# Patient Record
Sex: FEMALE | Race: OTHER | NOT HISPANIC OR LATINO | Employment: OTHER | URBAN - METROPOLITAN AREA
[De-identification: names, ages, dates, MRNs, and addresses within clinical notes are randomized per-mention and may not be internally consistent; named-entity substitution may affect disease eponyms.]

---

## 2017-03-05 RX ORDER — ESTRADIOL 0.1 MG/G
CREAM VAGINAL
Qty: 42.5 G | Refills: 1 | Status: SHIPPED | OUTPATIENT
Start: 2017-03-05 | End: 2019-11-06

## 2017-12-17 ENCOUNTER — OFFICE VISIT (OUTPATIENT)
Dept: URGENT CARE | Facility: CLINIC | Age: 57
End: 2017-12-17
Payer: COMMERCIAL

## 2017-12-17 VITALS
HEIGHT: 62 IN | SYSTOLIC BLOOD PRESSURE: 110 MMHG | WEIGHT: 160 LBS | HEART RATE: 89 BPM | RESPIRATION RATE: 18 BRPM | OXYGEN SATURATION: 99 % | TEMPERATURE: 100 F | DIASTOLIC BLOOD PRESSURE: 69 MMHG | BODY MASS INDEX: 29.44 KG/M2

## 2017-12-17 DIAGNOSIS — R05.9 COUGH: ICD-10-CM

## 2017-12-17 DIAGNOSIS — J10.1 INFLUENZA A: Primary | ICD-10-CM

## 2017-12-17 LAB
CTP QC/QA: YES
CTP QC/QA: YES
FLUAV AG NPH QL: POSITIVE
FLUBV AG NPH QL: NEGATIVE
S PYO RRNA THROAT QL PROBE: NEGATIVE

## 2017-12-17 PROCEDURE — 87804 INFLUENZA ASSAY W/OPTIC: CPT | Mod: QW,S$GLB,, | Performed by: PHYSICIAN ASSISTANT

## 2017-12-17 PROCEDURE — 96372 THER/PROPH/DIAG INJ SC/IM: CPT | Mod: S$GLB,,, | Performed by: PHYSICIAN ASSISTANT

## 2017-12-17 PROCEDURE — 99203 OFFICE O/P NEW LOW 30 MIN: CPT | Mod: 25,S$GLB,, | Performed by: PHYSICIAN ASSISTANT

## 2017-12-17 PROCEDURE — 87880 STREP A ASSAY W/OPTIC: CPT | Mod: QW,S$GLB,, | Performed by: PHYSICIAN ASSISTANT

## 2017-12-17 RX ORDER — PROMETHAZINE HYDROCHLORIDE AND CODEINE PHOSPHATE 6.25; 1 MG/5ML; MG/5ML
5 SOLUTION ORAL EVERY 6 HOURS PRN
Qty: 118 ML | Refills: 0 | Status: SHIPPED | OUTPATIENT
Start: 2017-12-17 | End: 2017-12-22

## 2017-12-17 RX ORDER — OSELTAMIVIR PHOSPHATE 75 MG/1
75 CAPSULE ORAL 2 TIMES DAILY
Qty: 10 CAPSULE | Refills: 0 | Status: SHIPPED | OUTPATIENT
Start: 2017-12-17 | End: 2017-12-22

## 2017-12-17 RX ORDER — BENZONATATE 200 MG/1
200 CAPSULE ORAL 3 TIMES DAILY PRN
Qty: 30 CAPSULE | Refills: 0 | Status: SHIPPED | OUTPATIENT
Start: 2017-12-17 | End: 2017-12-27

## 2017-12-17 RX ORDER — BETAMETHASONE SODIUM PHOSPHATE AND BETAMETHASONE ACETATE 3; 3 MG/ML; MG/ML
9 INJECTION, SUSPENSION INTRA-ARTICULAR; INTRALESIONAL; INTRAMUSCULAR; SOFT TISSUE ONCE
Status: COMPLETED | OUTPATIENT
Start: 2017-12-17 | End: 2017-12-17

## 2017-12-17 RX ADMIN — BETAMETHASONE SODIUM PHOSPHATE AND BETAMETHASONE ACETATE 9 MG: 3; 3 INJECTION, SUSPENSION INTRA-ARTICULAR; INTRALESIONAL; INTRAMUSCULAR; SOFT TISSUE at 09:12

## 2017-12-17 NOTE — PATIENT INSTRUCTIONS
- Please return here or go to the Emergency Department for any concerns or worsening of condition.   - If you were prescribed antibiotics, please take them to completion.  - If you were prescribed a narcotic medication, do not drive or operate heavy equipment or machinery while taking these medications.  - Please follow up with your primary care provider (PCP) or discussed specialist(s) as needed.           Influenza (Adult)    Influenza is also called the flu. It is a viral illness that affects the air passages of your lungs. It is different from the common cold. The flu can easily be passed from one to person to another. It may be spread through the air by coughing and sneezing. Or it can be spread by touching the sick person and then touching your own eyes, nose, or mouth.  The flu starts 1 to 3 days after you are exposed to the flu virus. It may last for 1 to 2 weeks but many people feel tired or fatigued for many weeks afterward. You usually dont need to take antibiotics unless you have a complication. This might be an ear or sinus infection or pneumonia.  Symptoms of the flu may be mild or severe. They can include extreme tiredness (wanting to stay in bed all day), chills, fevers, muscle aches, soreness with eye movement, headache, and a dry, hacking cough.  Home care  Follow these guidelines when caring for yourself at home:  · Avoid being around cigarette smoke, whether yours or other peoples.  · Acetaminophen or ibuprofen will help ease your fever, muscle aches, and headache. Dont give aspirin to anyone younger than 18 who has the flu. Aspirin can harm the liver.  · Nausea and loss of appetite are common with the flu. Eat light meals. Drink 6 to 8 glasses of liquids every day. Good choices are water, sport drinks, soft drinks without caffeine, juices, tea, and soup. Extra fluids will also help loosen secretions in your nose and lungs.  · Over-the-counter cold medicines will not make the flu go away  faster. But the medicines may help with coughing, sore throat, and congestion in your nose and sinuses. Dont use a decongestant if you have high blood pressure.  · Stay home until your fever has been gone for at least 24 hours without using medicine to reduce fever.  Follow-up care  Follow up with your healthcare provider, or as advised, if you are not getting better over the next week.  If you are age 65 or older, talk with your provider about getting a pneumococcal vaccine every 5 years. You should also get this vaccine if you have chronic asthma or COPD. All adults should get a flu vaccine every fall. Ask your provider about this.  When to seek medical advice  Call your healthcare provider right away if any of these occur:  · Cough with lots of colored mucus (sputum) or blood in your mucus  · Chest pain, shortness of breath, wheezing, or trouble breathing  · Severe headache, or face, neck, or ear pain  · New rash with fever  · Fever of 100.4°F (38°C) or higher, or as directed by your healthcare provider  · Confusion, behavior change, or seizure  · Severe weakness or dizziness  · You get a new fever or cough after getting better for a few days  Date Last Reviewed: 1/1/2017  © 4428-7091 [employer], [place]. [address] [place] [address], [place] [address]. All rights reserved. This information is not intended as a substitute for professional medical care. Always follow your healthcare professional's instructions.

## 2017-12-17 NOTE — PROGRESS NOTES
"Subjective:       Patient ID: Mayelin Chowdary is a 57 y.o. female.    Vitals:  height is 5' 2" (1.575 m) and weight is 72.6 kg (160 lb). Her tympanic temperature is 99.6 °F (37.6 °C). Her blood pressure is 110/69 and her pulse is 89. Her respiration is 18 and oxygen saturation is 99%.     Chief Complaint: Cough (since friday )    Present with cough and recent fever since Friday.        Cough   This is a new problem. The current episode started in the past 7 days (2 days ). The problem has been gradually worsening. The problem occurs constantly. The cough is non-productive. Associated symptoms include chills, a fever (102), headaches, myalgias, nasal congestion and a sore throat (from cough ). Pertinent negatives include no chest pain, ear pain, eye redness, rash, shortness of breath or wheezing. Treatments tried: nyquil and tylenol tessalon pearls  The treatment provided no relief.     Review of Systems   Constitution: Positive for chills, fever (102) and malaise/fatigue.   HENT: Positive for congestion and sore throat (from cough ). Negative for ear pain and hoarse voice.    Eyes: Negative for blurred vision, discharge, pain, redness and visual disturbance.   Cardiovascular: Negative for chest pain, dyspnea on exertion, leg swelling, near-syncope and syncope.   Respiratory: Positive for cough. Negative for shortness of breath, sputum production and wheezing.    Hematologic/Lymphatic: Negative for adenopathy.   Skin: Negative for itching and rash.   Musculoskeletal: Positive for myalgias. Negative for back pain, neck pain and stiffness.   Gastrointestinal: Negative for abdominal pain, diarrhea, nausea and vomiting.   Neurological: Positive for headaches. Negative for dizziness, light-headedness and numbness.   Psychiatric/Behavioral: Negative for altered mental status.   Allergic/Immunologic: Negative for hives.   All other systems reviewed and are negative.      Objective:      Physical Exam   Constitutional: She " is oriented to person, place, and time. She appears well-developed and well-nourished.  Non-toxic appearance. She has a sickly appearance. She does not appear ill. No distress.   HENT:   Head: Normocephalic and atraumatic.   Right Ear: Hearing, tympanic membrane, external ear and ear canal normal.   Left Ear: Hearing, tympanic membrane, external ear and ear canal normal.   Nose: No mucosal edema. No epistaxis. Right sinus exhibits no maxillary sinus tenderness and no frontal sinus tenderness. Left sinus exhibits no maxillary sinus tenderness and no frontal sinus tenderness.   Mouth/Throat: Uvula is midline and mucous membranes are normal. No uvula swelling. Posterior oropharyngeal erythema present. No oropharyngeal exudate.   Bilateral clear nasal congestion and erythema    Eyes: Pupils are equal, round, and reactive to light.   Neck: Normal range of motion. Neck supple.   Cardiovascular: Normal rate, regular rhythm and normal heart sounds.  Exam reveals no gallop and no friction rub.    No murmur heard.  Pulmonary/Chest: Effort normal and breath sounds normal. No respiratory distress. She has no decreased breath sounds. She has no wheezes. She has no rhonchi. She has no rales.   Musculoskeletal: Normal range of motion.   Lymphadenopathy:        Head (right side): No submental, no submandibular, no preauricular, no posterior auricular and no occipital adenopathy present.        Head (left side): No submental, no submandibular, no preauricular, no posterior auricular and no occipital adenopathy present.     She has no cervical adenopathy.        Right cervical: No posterior cervical adenopathy present.       Left cervical: No posterior cervical adenopathy present.        Right: No supraclavicular adenopathy present.        Left: No supraclavicular adenopathy present.   Neurological: She is alert and oriented to person, place, and time. She is not disoriented. Coordination and gait normal.   Skin: No abrasion, no  ecchymosis, no laceration and no rash noted. No erythema.   Psychiatric: She has a normal mood and affect. Her behavior is normal.   Nursing note and vitals reviewed.      Assessment:       1. Influenza A    2. Cough        Plan:         Influenza A  -     oseltamivir (TAMIFLU) 75 MG capsule; Take 1 capsule (75 mg total) by mouth 2 (two) times daily.  Dispense: 10 capsule; Refill: 0  -     promethazine-codeine 6.25-10 mg/5 ml (PHENERGAN WITH CODEINE) 6.25-10 mg/5 mL syrup; Take 5 mLs by mouth every 6 (six) hours as needed for Cough.  Dispense: 118 mL; Refill: 0  -     betamethasone acetate-betamethasone sodium phosphate injection 9 mg; Inject 1.5 mLs (9 mg total) into the muscle once.  -     benzonatate (TESSALON) 200 MG capsule; Take 1 capsule (200 mg total) by mouth 3 (three) times daily as needed for Cough.  Dispense: 30 capsule; Refill: 0    Cough  -     POCT rapid strep A  -     POCT Influenza A/B

## 2018-01-09 ENCOUNTER — TELEPHONE (OUTPATIENT)
Dept: OBSTETRICS AND GYNECOLOGY | Facility: CLINIC | Age: 58
End: 2018-01-09

## 2018-01-09 NOTE — TELEPHONE ENCOUNTER
Dr. Amato's office calling for patients last pap smear results with last office visit notes,please call shruthi back for more info

## 2019-11-06 ENCOUNTER — TELEPHONE (OUTPATIENT)
Dept: OBSTETRICS AND GYNECOLOGY | Facility: CLINIC | Age: 59
End: 2019-11-06

## 2019-11-06 ENCOUNTER — OFFICE VISIT (OUTPATIENT)
Dept: OBSTETRICS AND GYNECOLOGY | Facility: CLINIC | Age: 59
End: 2019-11-06
Attending: OBSTETRICS & GYNECOLOGY
Payer: COMMERCIAL

## 2019-11-06 VITALS
DIASTOLIC BLOOD PRESSURE: 82 MMHG | HEIGHT: 62 IN | BODY MASS INDEX: 29.8 KG/M2 | WEIGHT: 161.94 LBS | SYSTOLIC BLOOD PRESSURE: 116 MMHG

## 2019-11-06 DIAGNOSIS — M89.9 BONE DISORDER: ICD-10-CM

## 2019-11-06 DIAGNOSIS — Z12.31 VISIT FOR SCREENING MAMMOGRAM: Primary | ICD-10-CM

## 2019-11-06 DIAGNOSIS — Z01.419 ENCOUNTER FOR GYNECOLOGICAL EXAMINATION (GENERAL) (ROUTINE) WITHOUT ABNORMAL FINDINGS: ICD-10-CM

## 2019-11-06 DIAGNOSIS — Z78.0 MENOPAUSE: ICD-10-CM

## 2019-11-06 PROCEDURE — 99999 PR PBB SHADOW E&M-EST. PATIENT-LVL III: CPT | Mod: PBBFAC,,, | Performed by: OBSTETRICS & GYNECOLOGY

## 2019-11-06 PROCEDURE — 99386 PREV VISIT NEW AGE 40-64: CPT | Mod: S$GLB,,, | Performed by: OBSTETRICS & GYNECOLOGY

## 2019-11-06 PROCEDURE — 99386 PR PREVENTIVE VISIT,NEW,40-64: ICD-10-PCS | Mod: S$GLB,,, | Performed by: OBSTETRICS & GYNECOLOGY

## 2019-11-06 PROCEDURE — 99999 PR PBB SHADOW E&M-EST. PATIENT-LVL III: ICD-10-PCS | Mod: PBBFAC,,, | Performed by: OBSTETRICS & GYNECOLOGY

## 2019-11-06 RX ORDER — LEVOTHYROXINE SODIUM 137 UG/1
TABLET ORAL
Refills: 3 | COMMUNITY
Start: 2019-10-28 | End: 2021-01-08 | Stop reason: SDUPTHER

## 2019-11-06 RX ORDER — ROSUVASTATIN CALCIUM 20 MG/1
20 TABLET, COATED ORAL DAILY
Refills: 3 | COMMUNITY
Start: 2019-08-27 | End: 2021-05-13 | Stop reason: SDUPTHER

## 2019-11-06 RX ORDER — ESTRADIOL 1 MG/1
1 TABLET ORAL DAILY
Qty: 90 TABLET | Refills: 3 | Status: SHIPPED | OUTPATIENT
Start: 2019-11-06 | End: 2020-03-12

## 2019-11-06 RX ORDER — PROGESTERONE 100 MG/1
100 CAPSULE ORAL NIGHTLY
Qty: 90 CAPSULE | Refills: 3 | Status: SHIPPED | OUTPATIENT
Start: 2019-11-06 | End: 2021-01-06

## 2019-11-06 NOTE — TELEPHONE ENCOUNTER
Dr. Sotomayor pt, would like to know the name of the testerterone cream Dr. Yi recommend her to use. Please call pt and advise, thank you.

## 2019-11-06 NOTE — TELEPHONE ENCOUNTER
PLEASE DISREGARD PREVIOUS MESSAGE.    Dr. Sotomayor calling to check on the status of the new medication Dr. Sotomayor was going to call in for her at [facility] pharmacy. Please call pt and advise, thank you.

## 2019-11-06 NOTE — PROGRESS NOTES
Subjective:       Patient ID: Mayelin Chowdary is a 59 y.o. female.    Chief Complaint:  Annual Exam (c/o vag dyness, headaches, not sleeping well )      There is no problem list on file for this patient.      History of Present Illness  59 y.o. yo  here for annual exam. Reports she has some vaginal dryness and insomnia. On Premarin 0.9 and has been for years. I discussed other options. Patient would like to try. Will do Estradiol 1 mg and Prometrium 100 mg. Risks and benefits explained. All questions answered. Daughter in law is Andree Chowdary and they are moving to [place]. Pt sad      Past Medical History:   Diagnosis Date    Anxiety     High cholesterol     Hypothyroid     IBS (irritable bowel syndrome)     Migraines        Past Surgical History:   Procedure Laterality Date    APPENDECTOMY      bladder       HYSTERECTOMY      LUMPECTOMY,BREAST, WITH RADIOACTIVE SEED LOCALIZATION AND SENTINEL LYMPH NODE BIOPSY  2017    OOPHORECTOMY  1987    SHOULDER SURGERY Right     TUBAL LIGATION         OB History    Para Term  AB Living   2 2 2     2   SAB TAB Ectopic Multiple Live Births           2      # Outcome Date GA Lbr Benjamin/2nd Weight Sex Delivery Anes PTL Lv   2 Term 80    M Vag-Spont   CLARI   1 Term 77    F Vag-Spont   CLARI      Obstetric Comments   Menarche at 11       No LMP recorded. Patient has had a hysterectomy.   Date of Last Pap: No result found    Review of Systems  Review of Systems   Constitutional: Negative for fatigue and unexpected weight change.   Respiratory: Negative for shortness of breath.    Cardiovascular: Negative for chest pain.   Gastrointestinal: Negative for abdominal pain, constipation, diarrhea, nausea and vomiting.   Genitourinary: Negative for dysuria.   Musculoskeletal: Negative for back pain.   Skin: Negative for rash.   Neurological: Negative for headaches.   Hematological: Does not bruise/bleed easily.    Psychiatric/Behavioral: Negative for behavioral problems.        Objective:   Physical Exam:   Constitutional: She is oriented to person, place, and time. Vital signs are normal. She appears well-developed and well-nourished. No distress.        Pulmonary/Chest: She exhibits no mass. Right breast exhibits no mass, no nipple discharge, no skin change, no tenderness, no bleeding and no swelling. Left breast exhibits no mass, no nipple discharge, no skin change, no tenderness, no bleeding and no swelling. Breasts are symmetrical.        Abdominal: Soft. Normal appearance and bowel sounds are normal. She exhibits no distension and no mass. There is no tenderness. There is no rebound.     Genitourinary: Vagina normal. There is no rash, tenderness, lesion or injury on the right labia. There is no rash, tenderness, lesion or injury on the left labia. Uterus is absent. Right adnexum displays no mass, no tenderness and no fullness. Left adnexum displays no mass, no tenderness and no fullness. No erythema, tenderness, rectocele, cystocele or unspecified prolapse of vaginal walls in the vagina. No vaginal discharge found. Cervix exhibits absence.           Musculoskeletal: Normal range of motion and moves all extremeties.      Lymphadenopathy:     She has no axillary adenopathy.        Right: No supraclavicular adenopathy present.        Left: No supraclavicular adenopathy present.    Neurological: She is alert and oriented to person, place, and time.    Skin: Skin is warm and dry.    Psychiatric: She has a normal mood and affect. Her behavior is normal. Judgment normal.        Assessment/ Plan:     1. Visit for screening mammogram  Mammo Digital Screening Bilat w/ [facility]   2. Bone disorder  DXA Bone Density Spine And Hip   3. Encounter for gynecological examination (general) (routine) without abnormal findings     4. Menopause  progesterone (PROMETRIUM) 100 MG capsule    estradiol (ESTRACE) 1 MG tablet       Follow-up with me  in 1 year

## 2019-11-13 ENCOUNTER — HOSPITAL ENCOUNTER (OUTPATIENT)
Dept: RADIOLOGY | Facility: HOSPITAL | Age: 59
Discharge: HOME | End: 2019-11-13
Attending: OBSTETRICS & GYNECOLOGY
Payer: COMMERCIAL

## 2019-11-13 ENCOUNTER — HOSPITAL ENCOUNTER (OUTPATIENT)
Dept: RADIOLOGY | Facility: CLINIC | Age: 59
Discharge: HOME | End: 2019-11-13
Attending: OBSTETRICS & GYNECOLOGY
Payer: COMMERCIAL

## 2019-11-13 VITALS — BODY MASS INDEX: 29.82 KG/M2 | WEIGHT: 162.06 LBS | HEIGHT: 62 IN

## 2019-11-13 DIAGNOSIS — M89.9 BONE DISORDER: ICD-10-CM

## 2019-11-13 DIAGNOSIS — Z12.31 VISIT FOR SCREENING MAMMOGRAM: ICD-10-CM

## 2019-11-13 PROCEDURE — 77067 SCR MAMMO BI INCL CAD: CPT | Mod: TC

## 2019-11-13 PROCEDURE — 77080 DEXA BONE DENSITY SPINE HIP: ICD-10-PCS | Mod: 26,,, | Performed by: INTERNAL MEDICINE

## 2019-11-13 PROCEDURE — 77067 SCR MAMMO BI INCL CAD: CPT | Mod: 26,,, | Performed by: RADIOLOGY

## 2019-11-13 PROCEDURE — 77063 BREAST TOMOSYNTHESIS BI: CPT | Mod: 26,,, | Performed by: RADIOLOGY

## 2019-11-13 PROCEDURE — 77067 MAMMO DIGITAL SCREENING BILAT WITH TOMOSYNTHESIS_CAD: ICD-10-PCS | Mod: 26,,, | Performed by: RADIOLOGY

## 2019-11-13 PROCEDURE — 77080 DXA BONE DENSITY AXIAL: CPT | Mod: TC

## 2019-11-13 PROCEDURE — 77080 DXA BONE DENSITY AXIAL: CPT | Mod: 26,,, | Performed by: INTERNAL MEDICINE

## 2019-11-13 PROCEDURE — 77063 MAMMO DIGITAL SCREENING BILAT WITH TOMOSYNTHESIS_CAD: ICD-10-PCS | Mod: 26,,, | Performed by: RADIOLOGY

## 2019-11-20 ENCOUNTER — TELEPHONE (OUTPATIENT)
Dept: OBSTETRICS AND GYNECOLOGY | Facility: CLINIC | Age: 59
End: 2019-11-20

## 2019-11-20 NOTE — TELEPHONE ENCOUNTER
----- Message from Emilia Sotomayor MD sent at 11/20/2019  8:47 AM CST -----  Call pt. Bone density is normal. They are still waiting on her old MMG films to compare to this one before they issue a MMG report

## 2020-03-12 ENCOUNTER — TELEPHONE (OUTPATIENT)
Dept: OBSTETRICS AND GYNECOLOGY | Facility: CLINIC | Age: 60
End: 2020-03-12

## 2020-03-12 NOTE — TELEPHONE ENCOUNTER
Dr Sotomayor pt calling, to switch back to her Premarin 0.9 mg needs a New rx. Pt # [phone] Atif # [phone]

## 2021-01-06 ENCOUNTER — OFFICE VISIT (OUTPATIENT)
Dept: PRIMARY CARE CLINIC | Facility: CLINIC | Age: 61
End: 2021-01-06
Payer: COMMERCIAL

## 2021-01-06 VITALS
HEIGHT: 62 IN | TEMPERATURE: 98 F | OXYGEN SATURATION: 99 % | RESPIRATION RATE: 19 BRPM | HEART RATE: 60 BPM | WEIGHT: 166.88 LBS | BODY MASS INDEX: 30.71 KG/M2 | DIASTOLIC BLOOD PRESSURE: 72 MMHG | SYSTOLIC BLOOD PRESSURE: 123 MMHG

## 2021-01-06 DIAGNOSIS — E03.9 HYPOTHYROIDISM, UNSPECIFIED TYPE: Primary | ICD-10-CM

## 2021-01-06 DIAGNOSIS — Z13.220 SCREENING FOR LIPOID DISORDERS: ICD-10-CM

## 2021-01-06 DIAGNOSIS — Z11.59 NEED FOR HEPATITIS C SCREENING TEST: ICD-10-CM

## 2021-01-06 DIAGNOSIS — R09.81 NASAL CONGESTION: ICD-10-CM

## 2021-01-06 DIAGNOSIS — Z11.4 ENCOUNTER FOR SCREENING FOR HIV: ICD-10-CM

## 2021-01-06 DIAGNOSIS — N28.1 RENAL CYST, LEFT: ICD-10-CM

## 2021-01-06 DIAGNOSIS — G43.809 OTHER MIGRAINE WITHOUT STATUS MIGRAINOSUS, NOT INTRACTABLE: ICD-10-CM

## 2021-01-06 DIAGNOSIS — K76.89 HEPATIC CYST: ICD-10-CM

## 2021-01-06 DIAGNOSIS — Z00.00 NORMAL PHYSICAL EXAM, ROUTINE: ICD-10-CM

## 2021-01-06 PROBLEM — G43.909 MIGRAINE HEADACHE: Status: ACTIVE | Noted: 2021-01-06

## 2021-01-06 PROBLEM — K21.9 GASTROESOPHAGEAL REFLUX DISEASE: Status: ACTIVE | Noted: 2021-01-06

## 2021-01-06 PROCEDURE — 99204 PR OFFICE/OUTPT VISIT, NEW, LEVL IV, 45-59 MIN: ICD-10-PCS | Mod: S$GLB,,, | Performed by: INTERNAL MEDICINE

## 2021-01-06 PROCEDURE — 99999 PR PBB SHADOW E&M-EST. PATIENT-LVL IV: ICD-10-PCS | Mod: PBBFAC,,, | Performed by: INTERNAL MEDICINE

## 2021-01-06 PROCEDURE — 99204 OFFICE O/P NEW MOD 45 MIN: CPT | Mod: S$GLB,,, | Performed by: INTERNAL MEDICINE

## 2021-01-06 PROCEDURE — U0003 INFECTIOUS AGENT DETECTION BY NUCLEIC ACID (DNA OR RNA); SEVERE ACUTE RESPIRATORY SYNDROME CORONAVIRUS 2 (SARS-COV-2) (CORONAVIRUS DISEASE [COVID-19]), AMPLIFIED PROBE TECHNIQUE, MAKING USE OF HIGH THROUGHPUT TECHNOLOGIES AS DESCRIBED BY CMS-2020-01-R: HCPCS

## 2021-01-06 PROCEDURE — 1126F AMNT PAIN NOTED NONE PRSNT: CPT | Mod: S$GLB,,, | Performed by: INTERNAL MEDICINE

## 2021-01-06 PROCEDURE — 99999 PR PBB SHADOW E&M-EST. PATIENT-LVL IV: CPT | Mod: PBBFAC,,, | Performed by: INTERNAL MEDICINE

## 2021-01-06 PROCEDURE — 1126F PR PAIN SEVERITY QUANTIFIED, NO PAIN PRESENT: ICD-10-PCS | Mod: S$GLB,,, | Performed by: INTERNAL MEDICINE

## 2021-01-06 PROCEDURE — 3008F PR BODY MASS INDEX (BMI) DOCUMENTED: ICD-10-PCS | Mod: CPTII,S$GLB,, | Performed by: INTERNAL MEDICINE

## 2021-01-06 PROCEDURE — 3008F BODY MASS INDEX DOCD: CPT | Mod: CPTII,S$GLB,, | Performed by: INTERNAL MEDICINE

## 2021-01-06 RX ORDER — INFLUENZA A VIRUS A/NEBRASKA/14/2019 (H1N1) ANTIGEN (MDCK CELL DERIVED, PROPIOLACTONE INACTIVATED), INFLUENZA A VIRUS A/DELAWARE/39/2019 (H3N2) ANTIGEN (MDCK CELL DERIVED, PROPIOLACTONE INACTIVATED), INFLUENZA B VIRUS B/SINGAPORE/INFTT-16-0610/2016 ANTIGEN (MDCK CELL DERIVED, PROPIOLACTONE INACTIVATED), INFLUENZA B VIRUS B/DARWIN/7/2019 ANTIGEN (MDCK CELL DERIVED, PROPIOLACTONE INACTIVATED) 15; 15; 15; 15 UG/.5ML; UG/.5ML; UG/.5ML; UG/.5ML
INJECTION, SUSPENSION INTRAMUSCULAR
COMMUNITY
Start: 2020-11-18 | End: 2022-10-31

## 2021-01-06 RX ORDER — BUTALBITAL, ACETAMINOPHEN AND CAFFEINE 50; 325; 40 MG/1; MG/1; MG/1
TABLET ORAL
Qty: 30 TABLET | Refills: 0 | Status: SHIPPED | OUTPATIENT
Start: 2021-01-06 | End: 2021-05-13

## 2021-01-07 LAB — SARS-COV-2 RNA RESP QL NAA+PROBE: NOT DETECTED

## 2021-01-08 ENCOUNTER — PATIENT MESSAGE (OUTPATIENT)
Dept: PRIMARY CARE CLINIC | Facility: CLINIC | Age: 61
End: 2021-01-08

## 2021-01-08 DIAGNOSIS — E03.9 HYPOTHYROIDISM, UNSPECIFIED TYPE: Primary | ICD-10-CM

## 2021-01-08 RX ORDER — LEVOTHYROXINE SODIUM 175 UG/1
175 TABLET ORAL
Qty: 30 TABLET | Refills: 1 | Status: SHIPPED | OUTPATIENT
Start: 2021-01-08 | End: 2021-03-09

## 2021-01-11 ENCOUNTER — PATIENT OUTREACH (OUTPATIENT)
Dept: ADMINISTRATIVE | Facility: HOSPITAL | Age: 61
End: 2021-01-11

## 2021-01-12 ENCOUNTER — LAB VISIT (OUTPATIENT)
Dept: LAB | Facility: HOSPITAL | Age: 61
End: 2021-01-12
Attending: INTERNAL MEDICINE
Payer: COMMERCIAL

## 2021-01-12 DIAGNOSIS — Z11.59 NEED FOR HEPATITIS C SCREENING TEST: ICD-10-CM

## 2021-01-12 DIAGNOSIS — Z13.220 SCREENING FOR LIPOID DISORDERS: ICD-10-CM

## 2021-01-12 DIAGNOSIS — Z00.00 NORMAL PHYSICAL EXAM, ROUTINE: ICD-10-CM

## 2021-01-12 DIAGNOSIS — E03.9 HYPOTHYROIDISM, UNSPECIFIED TYPE: ICD-10-CM

## 2021-01-12 DIAGNOSIS — Z12.31 OTHER SCREENING MAMMOGRAM: ICD-10-CM

## 2021-01-12 DIAGNOSIS — Z11.4 ENCOUNTER FOR SCREENING FOR HIV: ICD-10-CM

## 2021-01-12 LAB
ALBUMIN SERPL BCP-MCNC: 3.8 G/DL (ref 3.5–5.2)
ALP SERPL-CCNC: 66 U/L (ref 55–135)
ALT SERPL W/O P-5'-P-CCNC: 18 U/L (ref 10–44)
ANION GAP SERPL CALC-SCNC: 9 MMOL/L (ref 8–16)
AST SERPL-CCNC: 15 U/L (ref 10–40)
BASOPHILS # BLD AUTO: 0.03 K/UL (ref 0–0.2)
BASOPHILS NFR BLD: 0.5 % (ref 0–1.9)
BILIRUB SERPL-MCNC: 0.2 MG/DL (ref 0.1–1)
BUN SERPL-MCNC: 15 MG/DL (ref 8–23)
CALCIUM SERPL-MCNC: 8.8 MG/DL (ref 8.7–10.5)
CHLORIDE SERPL-SCNC: 104 MMOL/L (ref 95–110)
CHOLEST SERPL-MCNC: 207 MG/DL (ref 120–199)
CHOLEST/HDLC SERPL: 3.5 {RATIO} (ref 2–5)
CO2 SERPL-SCNC: 27 MMOL/L (ref 23–29)
CREAT SERPL-MCNC: 0.8 MG/DL (ref 0.5–1.4)
DIFFERENTIAL METHOD: NORMAL
EOSINOPHIL # BLD AUTO: 0.1 K/UL (ref 0–0.5)
EOSINOPHIL NFR BLD: 1.7 % (ref 0–8)
ERYTHROCYTE [DISTWIDTH] IN BLOOD BY AUTOMATED COUNT: 13.2 % (ref 11.5–14.5)
EST. GFR  (AFRICAN AMERICAN): >60 ML/MIN/1.73 M^2
EST. GFR  (NON AFRICAN AMERICAN): >60 ML/MIN/1.73 M^2
GLUCOSE SERPL-MCNC: 103 MG/DL (ref 70–110)
HCT VFR BLD AUTO: 43.4 % (ref 37–48.5)
HDLC SERPL-MCNC: 60 MG/DL (ref 40–75)
HDLC SERPL: 29 % (ref 20–50)
HGB BLD-MCNC: 13.9 G/DL (ref 12–16)
IMM GRANULOCYTES # BLD AUTO: 0.02 K/UL (ref 0–0.04)
IMM GRANULOCYTES NFR BLD AUTO: 0.3 % (ref 0–0.5)
LDLC SERPL CALC-MCNC: 116.4 MG/DL (ref 63–159)
LYMPHOCYTES # BLD AUTO: 2.4 K/UL (ref 1–4.8)
LYMPHOCYTES NFR BLD: 36.3 % (ref 18–48)
MCH RBC QN AUTO: 29.1 PG (ref 27–31)
MCHC RBC AUTO-ENTMCNC: 32 G/DL (ref 32–36)
MCV RBC AUTO: 91 FL (ref 82–98)
MONOCYTES # BLD AUTO: 0.6 K/UL (ref 0.3–1)
MONOCYTES NFR BLD: 8.3 % (ref 4–15)
NEUTROPHILS # BLD AUTO: 3.5 K/UL (ref 1.8–7.7)
NEUTROPHILS NFR BLD: 52.9 % (ref 38–73)
NONHDLC SERPL-MCNC: 147 MG/DL
NRBC BLD-RTO: 0 /100 WBC
PLATELET # BLD AUTO: 241 K/UL (ref 150–350)
PMV BLD AUTO: 11 FL (ref 9.2–12.9)
POTASSIUM SERPL-SCNC: 4.5 MMOL/L (ref 3.5–5.1)
PROT SERPL-MCNC: 6.6 G/DL (ref 6–8.4)
RBC # BLD AUTO: 4.77 M/UL (ref 4–5.4)
SODIUM SERPL-SCNC: 140 MMOL/L (ref 136–145)
T3FREE SERPL-MCNC: 3.5 PG/ML (ref 2.3–4.2)
T4 FREE SERPL-MCNC: 1.26 NG/DL (ref 0.71–1.51)
TRIGL SERPL-MCNC: 153 MG/DL (ref 30–150)
TSH SERPL DL<=0.005 MIU/L-ACNC: 0.17 UIU/ML (ref 0.4–4)
WBC # BLD AUTO: 6.61 K/UL (ref 3.9–12.7)

## 2021-01-12 PROCEDURE — 36415 COLL VENOUS BLD VENIPUNCTURE: CPT | Mod: PN

## 2021-01-12 PROCEDURE — 80053 COMPREHEN METABOLIC PANEL: CPT

## 2021-01-12 PROCEDURE — 86803 HEPATITIS C AB TEST: CPT

## 2021-01-12 PROCEDURE — 80061 LIPID PANEL: CPT

## 2021-01-12 PROCEDURE — 85025 COMPLETE CBC W/AUTO DIFF WBC: CPT

## 2021-01-12 PROCEDURE — 84443 ASSAY THYROID STIM HORMONE: CPT

## 2021-01-12 PROCEDURE — 84481 FREE ASSAY (FT-3): CPT

## 2021-01-12 PROCEDURE — 84439 ASSAY OF FREE THYROXINE: CPT

## 2021-01-12 PROCEDURE — 86703 HIV-1/HIV-2 1 RESULT ANTBDY: CPT

## 2021-01-13 LAB
HCV AB SERPL QL IA: NEGATIVE
HIV 1+2 AB+HIV1 P24 AG SERPL QL IA: NEGATIVE

## 2021-01-14 ENCOUNTER — TELEPHONE (OUTPATIENT)
Dept: PRIMARY CARE CLINIC | Facility: CLINIC | Age: 61
End: 2021-01-14

## 2021-01-14 ENCOUNTER — HOSPITAL ENCOUNTER (OUTPATIENT)
Dept: RADIOLOGY | Facility: HOSPITAL | Age: 61
Discharge: HOME | End: 2021-01-14
Attending: INTERNAL MEDICINE
Payer: COMMERCIAL

## 2021-01-14 VITALS — HEIGHT: 62 IN | WEIGHT: 166.44 LBS | BODY MASS INDEX: 30.63 KG/M2

## 2021-01-14 DIAGNOSIS — E03.9 HYPOTHYROIDISM, UNSPECIFIED TYPE: Primary | ICD-10-CM

## 2021-01-14 DIAGNOSIS — Z12.31 OTHER SCREENING MAMMOGRAM: ICD-10-CM

## 2021-01-14 PROCEDURE — 77067 MAMMO DIGITAL SCREENING BILAT WITH TOMO: ICD-10-PCS | Mod: 26,,, | Performed by: RADIOLOGY

## 2021-01-14 PROCEDURE — 77063 MAMMO DIGITAL SCREENING BILAT WITH TOMO: ICD-10-PCS | Mod: 26,,, | Performed by: RADIOLOGY

## 2021-01-14 PROCEDURE — 77063 BREAST TOMOSYNTHESIS BI: CPT | Mod: 26,,, | Performed by: RADIOLOGY

## 2021-01-14 PROCEDURE — 77067 SCR MAMMO BI INCL CAD: CPT | Mod: 26,,, | Performed by: RADIOLOGY

## 2021-01-14 PROCEDURE — 77067 SCR MAMMO BI INCL CAD: CPT | Mod: TC

## 2021-03-09 DIAGNOSIS — E03.9 HYPOTHYROIDISM, UNSPECIFIED TYPE: ICD-10-CM

## 2021-03-09 RX ORDER — LEVOTHYROXINE SODIUM 175 UG/1
TABLET ORAL
Qty: 30 TABLET | Refills: 0 | Status: SHIPPED | OUTPATIENT
Start: 2021-03-09 | End: 2021-03-09

## 2021-04-14 DIAGNOSIS — E03.9 HYPOTHYROIDISM, UNSPECIFIED TYPE: ICD-10-CM

## 2021-04-14 RX ORDER — LEVOTHYROXINE SODIUM 175 UG/1
TABLET ORAL
Qty: 77 TABLET | Refills: 0 | Status: SHIPPED | OUTPATIENT
Start: 2021-04-14 | End: 2021-05-06 | Stop reason: SDUPTHER

## 2021-04-15 ENCOUNTER — PATIENT MESSAGE (OUTPATIENT)
Dept: PRIMARY CARE CLINIC | Facility: CLINIC | Age: 61
End: 2021-04-15

## 2021-04-22 ENCOUNTER — LAB VISIT (OUTPATIENT)
Dept: LAB | Facility: HOSPITAL | Age: 61
End: 2021-04-22
Attending: INTERNAL MEDICINE
Payer: COMMERCIAL

## 2021-04-22 DIAGNOSIS — E03.9 HYPOTHYROIDISM, UNSPECIFIED TYPE: ICD-10-CM

## 2021-04-22 LAB
T4 FREE SERPL-MCNC: 1.48 NG/DL (ref 0.71–1.51)
TSH SERPL DL<=0.005 MIU/L-ACNC: 0.02 UIU/ML (ref 0.4–4)

## 2021-04-22 PROCEDURE — 36415 COLL VENOUS BLD VENIPUNCTURE: CPT | Performed by: INTERNAL MEDICINE

## 2021-04-22 PROCEDURE — 84443 ASSAY THYROID STIM HORMONE: CPT | Performed by: INTERNAL MEDICINE

## 2021-04-22 PROCEDURE — 84439 ASSAY OF FREE THYROXINE: CPT | Performed by: INTERNAL MEDICINE

## 2021-05-06 DIAGNOSIS — E03.9 HYPOTHYROIDISM, UNSPECIFIED TYPE: ICD-10-CM

## 2021-05-06 RX ORDER — LEVOTHYROXINE SODIUM 137 UG/1
TABLET ORAL
Qty: 90 TABLET | Refills: 0 | Status: SHIPPED | OUTPATIENT
Start: 2021-05-06 | End: 2021-07-12 | Stop reason: SDUPTHER

## 2021-05-13 ENCOUNTER — LAB VISIT (OUTPATIENT)
Dept: LAB | Facility: HOSPITAL | Age: 61
End: 2021-05-13
Attending: INTERNAL MEDICINE
Payer: COMMERCIAL

## 2021-05-13 ENCOUNTER — OFFICE VISIT (OUTPATIENT)
Dept: PRIMARY CARE CLINIC | Facility: CLINIC | Age: 61
End: 2021-05-13
Payer: COMMERCIAL

## 2021-05-13 VITALS
HEART RATE: 67 BPM | BODY MASS INDEX: 30.44 KG/M2 | TEMPERATURE: 98 F | HEIGHT: 62 IN | SYSTOLIC BLOOD PRESSURE: 130 MMHG | DIASTOLIC BLOOD PRESSURE: 74 MMHG | WEIGHT: 165.38 LBS | OXYGEN SATURATION: 98 %

## 2021-05-13 DIAGNOSIS — Z78.0 POSTMENOPAUSAL: ICD-10-CM

## 2021-05-13 DIAGNOSIS — F41.8 SITUATIONAL ANXIETY: ICD-10-CM

## 2021-05-13 DIAGNOSIS — G43.809 OTHER MIGRAINE WITHOUT STATUS MIGRAINOSUS, NOT INTRACTABLE: Primary | ICD-10-CM

## 2021-05-13 DIAGNOSIS — G43.809 OTHER MIGRAINE WITHOUT STATUS MIGRAINOSUS, NOT INTRACTABLE: ICD-10-CM

## 2021-05-13 DIAGNOSIS — Z00.00 NORMAL PHYSICAL EXAM, ROUTINE: ICD-10-CM

## 2021-05-13 DIAGNOSIS — E78.5 HYPERLIPIDEMIA, UNSPECIFIED HYPERLIPIDEMIA TYPE: ICD-10-CM

## 2021-05-13 DIAGNOSIS — E03.9 HYPOTHYROIDISM, UNSPECIFIED TYPE: ICD-10-CM

## 2021-05-13 DIAGNOSIS — K76.89 HEPATIC CYST: ICD-10-CM

## 2021-05-13 DIAGNOSIS — R11.0 NAUSEA: ICD-10-CM

## 2021-05-13 DIAGNOSIS — N28.1 RENAL CYST, LEFT: ICD-10-CM

## 2021-05-13 LAB
CREAT SERPL-MCNC: 0.7 MG/DL (ref 0.5–1.4)
EST. GFR  (AFRICAN AMERICAN): >60 ML/MIN/1.73 M^2
EST. GFR  (NON AFRICAN AMERICAN): >60 ML/MIN/1.73 M^2

## 2021-05-13 PROCEDURE — 99214 OFFICE O/P EST MOD 30 MIN: CPT | Mod: S$GLB,,, | Performed by: INTERNAL MEDICINE

## 2021-05-13 PROCEDURE — 3008F BODY MASS INDEX DOCD: CPT | Mod: CPTII,S$GLB,, | Performed by: INTERNAL MEDICINE

## 2021-05-13 PROCEDURE — 99999 PR PBB SHADOW E&M-EST. PATIENT-LVL IV: CPT | Mod: PBBFAC,,, | Performed by: INTERNAL MEDICINE

## 2021-05-13 PROCEDURE — 99999 PR PBB SHADOW E&M-EST. PATIENT-LVL IV: ICD-10-PCS | Mod: PBBFAC,,, | Performed by: INTERNAL MEDICINE

## 2021-05-13 PROCEDURE — 93010 ELECTROCARDIOGRAM REPORT: CPT | Mod: S$GLB,,, | Performed by: INTERNAL MEDICINE

## 2021-05-13 PROCEDURE — 93005 EKG 12-LEAD: ICD-10-PCS | Mod: S$GLB,,, | Performed by: INTERNAL MEDICINE

## 2021-05-13 PROCEDURE — 1126F AMNT PAIN NOTED NONE PRSNT: CPT | Mod: S$GLB,,, | Performed by: INTERNAL MEDICINE

## 2021-05-13 PROCEDURE — 93010 EKG 12-LEAD: ICD-10-PCS | Mod: S$GLB,,, | Performed by: INTERNAL MEDICINE

## 2021-05-13 PROCEDURE — 1126F PR PAIN SEVERITY QUANTIFIED, NO PAIN PRESENT: ICD-10-PCS | Mod: S$GLB,,, | Performed by: INTERNAL MEDICINE

## 2021-05-13 PROCEDURE — 99214 PR OFFICE/OUTPT VISIT, EST, LEVL IV, 30-39 MIN: ICD-10-PCS | Mod: S$GLB,,, | Performed by: INTERNAL MEDICINE

## 2021-05-13 PROCEDURE — 93005 ELECTROCARDIOGRAM TRACING: CPT | Mod: S$GLB,,, | Performed by: INTERNAL MEDICINE

## 2021-05-13 PROCEDURE — 82565 ASSAY OF CREATININE: CPT | Performed by: INTERNAL MEDICINE

## 2021-05-13 PROCEDURE — 36415 COLL VENOUS BLD VENIPUNCTURE: CPT | Mod: PN | Performed by: INTERNAL MEDICINE

## 2021-05-13 PROCEDURE — 3008F PR BODY MASS INDEX (BMI) DOCUMENTED: ICD-10-PCS | Mod: CPTII,S$GLB,, | Performed by: INTERNAL MEDICINE

## 2021-05-13 RX ORDER — ROSUVASTATIN CALCIUM 20 MG/1
20 TABLET, COATED ORAL DAILY
Qty: 90 TABLET | Refills: 3 | Status: SHIPPED | OUTPATIENT
Start: 2021-05-13 | End: 2022-01-10 | Stop reason: SDUPTHER

## 2021-05-13 RX ORDER — AMITRIPTYLINE HYDROCHLORIDE 10 MG/1
10 TABLET, FILM COATED ORAL NIGHTLY PRN
Qty: 30 TABLET | Refills: 1 | Status: SHIPPED | OUTPATIENT
Start: 2021-05-13 | End: 2021-07-12 | Stop reason: SDUPTHER

## 2021-05-13 RX ORDER — ALPRAZOLAM 0.25 MG/1
TABLET ORAL
Qty: 30 TABLET | Refills: 0 | Status: SHIPPED | OUTPATIENT
Start: 2021-05-13 | End: 2022-01-10 | Stop reason: SDUPTHER

## 2021-05-13 RX ORDER — SUMATRIPTAN 50 MG/1
TABLET, FILM COATED ORAL
Qty: 12 TABLET | Refills: 0 | Status: SHIPPED | OUTPATIENT
Start: 2021-05-13 | End: 2021-07-12 | Stop reason: SDUPTHER

## 2021-07-08 ENCOUNTER — LAB VISIT (OUTPATIENT)
Dept: LAB | Facility: HOSPITAL | Age: 61
End: 2021-07-08
Attending: INTERNAL MEDICINE
Payer: COMMERCIAL

## 2021-07-08 DIAGNOSIS — E03.9 HYPOTHYROIDISM, UNSPECIFIED TYPE: ICD-10-CM

## 2021-07-08 LAB
T4 FREE SERPL-MCNC: 1.01 NG/DL (ref 0.71–1.51)
TSH SERPL DL<=0.005 MIU/L-ACNC: 1.28 UIU/ML (ref 0.4–4)

## 2021-07-08 PROCEDURE — 84443 ASSAY THYROID STIM HORMONE: CPT | Performed by: INTERNAL MEDICINE

## 2021-07-08 PROCEDURE — 84439 ASSAY OF FREE THYROXINE: CPT | Performed by: INTERNAL MEDICINE

## 2021-07-08 PROCEDURE — 36415 COLL VENOUS BLD VENIPUNCTURE: CPT | Performed by: INTERNAL MEDICINE

## 2021-07-12 ENCOUNTER — OFFICE VISIT (OUTPATIENT)
Dept: PRIMARY CARE CLINIC | Facility: CLINIC | Age: 61
End: 2021-07-12
Payer: COMMERCIAL

## 2021-07-12 VITALS
SYSTOLIC BLOOD PRESSURE: 118 MMHG | RESPIRATION RATE: 18 BRPM | OXYGEN SATURATION: 98 % | WEIGHT: 169.56 LBS | HEIGHT: 62 IN | TEMPERATURE: 98 F | DIASTOLIC BLOOD PRESSURE: 78 MMHG | HEART RATE: 73 BPM | BODY MASS INDEX: 31.2 KG/M2

## 2021-07-12 DIAGNOSIS — Z00.00 NORMAL PHYSICAL EXAM, ROUTINE: Primary | ICD-10-CM

## 2021-07-12 DIAGNOSIS — F41.8 SITUATIONAL ANXIETY: ICD-10-CM

## 2021-07-12 DIAGNOSIS — G56.03 BILATERAL CARPAL TUNNEL SYNDROME: ICD-10-CM

## 2021-07-12 DIAGNOSIS — G43.809 OTHER MIGRAINE WITHOUT STATUS MIGRAINOSUS, NOT INTRACTABLE: ICD-10-CM

## 2021-07-12 DIAGNOSIS — Z13.31 POSITIVE DEPRESSION SCREENING: ICD-10-CM

## 2021-07-12 DIAGNOSIS — E78.5 HYPERLIPIDEMIA, UNSPECIFIED HYPERLIPIDEMIA TYPE: ICD-10-CM

## 2021-07-12 DIAGNOSIS — E03.9 HYPOTHYROIDISM, UNSPECIFIED TYPE: ICD-10-CM

## 2021-07-12 DIAGNOSIS — K76.0 FATTY LIVER: ICD-10-CM

## 2021-07-12 PROCEDURE — 99396 PR PREVENTIVE VISIT,EST,40-64: ICD-10-PCS | Mod: S$GLB,,, | Performed by: INTERNAL MEDICINE

## 2021-07-12 PROCEDURE — 99396 PREV VISIT EST AGE 40-64: CPT | Mod: S$GLB,,, | Performed by: INTERNAL MEDICINE

## 2021-07-12 PROCEDURE — 99999 PR PBB SHADOW E&M-EST. PATIENT-LVL IV: CPT | Mod: PBBFAC,,, | Performed by: INTERNAL MEDICINE

## 2021-07-12 PROCEDURE — 3008F BODY MASS INDEX DOCD: CPT | Mod: CPTII,S$GLB,, | Performed by: INTERNAL MEDICINE

## 2021-07-12 PROCEDURE — 99999 PR PBB SHADOW E&M-EST. PATIENT-LVL IV: ICD-10-PCS | Mod: PBBFAC,,, | Performed by: INTERNAL MEDICINE

## 2021-07-12 PROCEDURE — 1126F PR PAIN SEVERITY QUANTIFIED, NO PAIN PRESENT: ICD-10-PCS | Mod: S$GLB,,, | Performed by: INTERNAL MEDICINE

## 2021-07-12 PROCEDURE — 1126F AMNT PAIN NOTED NONE PRSNT: CPT | Mod: S$GLB,,, | Performed by: INTERNAL MEDICINE

## 2021-07-12 PROCEDURE — 3008F PR BODY MASS INDEX (BMI) DOCUMENTED: ICD-10-PCS | Mod: CPTII,S$GLB,, | Performed by: INTERNAL MEDICINE

## 2021-07-12 RX ORDER — SUMATRIPTAN 50 MG/1
TABLET, FILM COATED ORAL
Qty: 12 TABLET | Refills: 0 | Status: SHIPPED | OUTPATIENT
Start: 2021-07-12 | End: 2022-10-31 | Stop reason: SDUPTHER

## 2021-07-12 RX ORDER — PROMETHAZINE HYDROCHLORIDE 25 MG/1
25 TABLET ORAL EVERY 8 HOURS PRN
COMMUNITY
Start: 2021-03-17 | End: 2021-07-12 | Stop reason: SDUPTHER

## 2021-07-12 RX ORDER — LEVOTHYROXINE SODIUM 137 UG/1
TABLET ORAL
Qty: 90 TABLET | Refills: 0 | Status: SHIPPED | OUTPATIENT
Start: 2021-07-12 | End: 2021-11-29

## 2021-07-12 RX ORDER — METHYLPREDNISOLONE 4 MG/1
TABLET ORAL
COMMUNITY
Start: 2021-06-14 | End: 2021-07-12

## 2021-07-12 RX ORDER — PROMETHAZINE HYDROCHLORIDE 25 MG/1
25 TABLET ORAL EVERY 8 HOURS PRN
Qty: 30 TABLET | Refills: 0 | Status: SHIPPED | OUTPATIENT
Start: 2021-07-12 | End: 2022-01-10 | Stop reason: SDUPTHER

## 2021-07-12 RX ORDER — AMITRIPTYLINE HYDROCHLORIDE 10 MG/1
10 TABLET, FILM COATED ORAL NIGHTLY PRN
Qty: 30 TABLET | Refills: 5 | Status: SHIPPED | OUTPATIENT
Start: 2021-07-12 | End: 2022-01-10 | Stop reason: SDUPTHER

## 2021-07-14 ENCOUNTER — PATIENT OUTREACH (OUTPATIENT)
Dept: ADMINISTRATIVE | Facility: HOSPITAL | Age: 61
End: 2021-07-14

## 2021-08-05 ENCOUNTER — PATIENT MESSAGE (OUTPATIENT)
Dept: PRIMARY CARE CLINIC | Facility: CLINIC | Age: 61
End: 2021-08-05

## 2021-08-22 ENCOUNTER — NURSE TRIAGE (OUTPATIENT)
Dept: ADMINISTRATIVE | Facility: CLINIC | Age: 61
End: 2021-08-22

## 2021-08-24 DIAGNOSIS — G56.23 CUBITAL TUNNEL SYNDROME OF BOTH UPPER EXTREMITIES: Primary | ICD-10-CM

## 2021-08-24 DIAGNOSIS — G56.03 CARPAL TUNNEL SYNDROME, BILATERAL UPPER LIMBS: ICD-10-CM

## 2021-09-18 ENCOUNTER — PATIENT MESSAGE (OUTPATIENT)
Dept: PRIMARY CARE CLINIC | Facility: CLINIC | Age: 61
End: 2021-09-18

## 2021-10-11 PROBLEM — Z00.00 NORMAL PHYSICAL EXAM, ROUTINE: Status: RESOLVED | Noted: 2021-07-12 | Resolved: 2021-10-11

## 2021-11-05 ENCOUNTER — PATIENT MESSAGE (OUTPATIENT)
Dept: PRIMARY CARE CLINIC | Facility: CLINIC | Age: 61
End: 2021-11-05
Payer: COMMERCIAL

## 2021-11-05 ENCOUNTER — OFFICE VISIT (OUTPATIENT)
Dept: URGENT CARE | Facility: CLINIC | Age: 61
End: 2021-11-05
Payer: COMMERCIAL

## 2021-11-05 VITALS
DIASTOLIC BLOOD PRESSURE: 79 MMHG | RESPIRATION RATE: 16 BRPM | BODY MASS INDEX: 30.55 KG/M2 | OXYGEN SATURATION: 96 % | WEIGHT: 166 LBS | SYSTOLIC BLOOD PRESSURE: 115 MMHG | HEIGHT: 62 IN | HEART RATE: 65 BPM | TEMPERATURE: 96 F

## 2021-11-05 DIAGNOSIS — J32.9 SINUSITIS, UNSPECIFIED CHRONICITY, UNSPECIFIED LOCATION: Primary | ICD-10-CM

## 2021-11-05 PROCEDURE — 3078F PR MOST RECENT DIASTOLIC BLOOD PRESSURE < 80 MM HG: ICD-10-PCS | Mod: CPTII,S$GLB,, | Performed by: PHYSICIAN ASSISTANT

## 2021-11-05 PROCEDURE — 99213 PR OFFICE/OUTPT VISIT, EST, LEVL III, 20-29 MIN: ICD-10-PCS | Mod: S$GLB,,, | Performed by: PHYSICIAN ASSISTANT

## 2021-11-05 PROCEDURE — 3078F DIAST BP <80 MM HG: CPT | Mod: CPTII,S$GLB,, | Performed by: PHYSICIAN ASSISTANT

## 2021-11-05 PROCEDURE — 3008F BODY MASS INDEX DOCD: CPT | Mod: CPTII,S$GLB,, | Performed by: PHYSICIAN ASSISTANT

## 2021-11-05 PROCEDURE — 1159F PR MEDICATION LIST DOCUMENTED IN MEDICAL RECORD: ICD-10-PCS | Mod: CPTII,S$GLB,, | Performed by: PHYSICIAN ASSISTANT

## 2021-11-05 PROCEDURE — 1160F PR REVIEW ALL MEDS BY PRESCRIBER/CLIN PHARMACIST DOCUMENTED: ICD-10-PCS | Mod: CPTII,S$GLB,, | Performed by: PHYSICIAN ASSISTANT

## 2021-11-05 PROCEDURE — 1159F MED LIST DOCD IN RCRD: CPT | Mod: CPTII,S$GLB,, | Performed by: PHYSICIAN ASSISTANT

## 2021-11-05 PROCEDURE — 3074F PR MOST RECENT SYSTOLIC BLOOD PRESSURE < 130 MM HG: ICD-10-PCS | Mod: CPTII,S$GLB,, | Performed by: PHYSICIAN ASSISTANT

## 2021-11-05 PROCEDURE — 1160F RVW MEDS BY RX/DR IN RCRD: CPT | Mod: CPTII,S$GLB,, | Performed by: PHYSICIAN ASSISTANT

## 2021-11-05 PROCEDURE — 3074F SYST BP LT 130 MM HG: CPT | Mod: CPTII,S$GLB,, | Performed by: PHYSICIAN ASSISTANT

## 2021-11-05 PROCEDURE — 3008F PR BODY MASS INDEX (BMI) DOCUMENTED: ICD-10-PCS | Mod: CPTII,S$GLB,, | Performed by: PHYSICIAN ASSISTANT

## 2021-11-05 PROCEDURE — 99213 OFFICE O/P EST LOW 20 MIN: CPT | Mod: S$GLB,,, | Performed by: PHYSICIAN ASSISTANT

## 2021-11-05 RX ORDER — AMOXICILLIN 875 MG/1
875 TABLET, FILM COATED ORAL EVERY 12 HOURS
Qty: 14 TABLET | Refills: 0 | Status: SHIPPED | OUTPATIENT
Start: 2021-11-05 | End: 2021-11-12

## 2021-11-05 RX ORDER — IPRATROPIUM BROMIDE 42 UG/1
2 SPRAY, METERED NASAL 2 TIMES DAILY
Qty: 15 ML | Refills: 0 | Status: SHIPPED | OUTPATIENT
Start: 2021-11-05 | End: 2022-01-10 | Stop reason: SDUPTHER

## 2021-11-29 ENCOUNTER — IMMUNIZATION (OUTPATIENT)
Dept: PRIMARY CARE CLINIC | Facility: CLINIC | Age: 61
End: 2021-11-29
Payer: COMMERCIAL

## 2021-11-29 DIAGNOSIS — Z23 NEED FOR VACCINATION: Primary | ICD-10-CM

## 2021-11-29 PROCEDURE — 0064A COVID-19, MRNA, LNP-S, PF, 100 MCG/0.25 ML DOSE VACCINE (MODERNA BOOSTER): CPT | Mod: CV19,PBBFAC | Performed by: INTERNAL MEDICINE

## 2021-12-28 ENCOUNTER — PATIENT MESSAGE (OUTPATIENT)
Dept: PRIMARY CARE CLINIC | Facility: CLINIC | Age: 61
End: 2021-12-28
Payer: COMMERCIAL

## 2022-01-06 ENCOUNTER — LAB VISIT (OUTPATIENT)
Dept: LAB | Facility: HOSPITAL | Age: 62
End: 2022-01-06
Attending: INTERNAL MEDICINE
Payer: COMMERCIAL

## 2022-01-06 DIAGNOSIS — E78.5 HYPERLIPIDEMIA, UNSPECIFIED HYPERLIPIDEMIA TYPE: ICD-10-CM

## 2022-01-06 DIAGNOSIS — Z00.00 NORMAL PHYSICAL EXAM, ROUTINE: ICD-10-CM

## 2022-01-06 LAB
ALBUMIN SERPL BCP-MCNC: 4 G/DL (ref 3.5–5.2)
ALP SERPL-CCNC: 70 U/L (ref 55–135)
ALT SERPL W/O P-5'-P-CCNC: 29 U/L (ref 10–44)
ANION GAP SERPL CALC-SCNC: 12 MMOL/L (ref 8–16)
AST SERPL-CCNC: 19 U/L (ref 10–40)
BASOPHILS # BLD AUTO: 0.05 K/UL (ref 0–0.2)
BASOPHILS NFR BLD: 0.6 % (ref 0–1.9)
BILIRUB SERPL-MCNC: 0.4 MG/DL (ref 0.1–1)
BUN SERPL-MCNC: 13 MG/DL (ref 8–23)
CALCIUM SERPL-MCNC: 9.5 MG/DL (ref 8.7–10.5)
CHLORIDE SERPL-SCNC: 106 MMOL/L (ref 95–110)
CHOLEST SERPL-MCNC: 285 MG/DL (ref 120–199)
CHOLEST/HDLC SERPL: 5.5 {RATIO} (ref 2–5)
CO2 SERPL-SCNC: 23 MMOL/L (ref 23–29)
CREAT SERPL-MCNC: 0.7 MG/DL (ref 0.5–1.4)
DIFFERENTIAL METHOD: NORMAL
EOSINOPHIL # BLD AUTO: 0.3 K/UL (ref 0–0.5)
EOSINOPHIL NFR BLD: 3.2 % (ref 0–8)
ERYTHROCYTE [DISTWIDTH] IN BLOOD BY AUTOMATED COUNT: 13.2 % (ref 11.5–14.5)
EST. GFR  (AFRICAN AMERICAN): >60 ML/MIN/1.73 M^2
EST. GFR  (NON AFRICAN AMERICAN): >60 ML/MIN/1.73 M^2
GLUCOSE SERPL-MCNC: 107 MG/DL (ref 70–110)
HCT VFR BLD AUTO: 43.7 % (ref 37–48.5)
HDLC SERPL-MCNC: 52 MG/DL (ref 40–75)
HDLC SERPL: 18.2 % (ref 20–50)
HGB BLD-MCNC: 14.6 G/DL (ref 12–16)
IMM GRANULOCYTES # BLD AUTO: 0.01 K/UL (ref 0–0.04)
IMM GRANULOCYTES NFR BLD AUTO: 0.1 % (ref 0–0.5)
LDLC SERPL CALC-MCNC: 168.6 MG/DL (ref 63–159)
LYMPHOCYTES # BLD AUTO: 3.7 K/UL (ref 1–4.8)
LYMPHOCYTES NFR BLD: 45.8 % (ref 18–48)
MCH RBC QN AUTO: 29.6 PG (ref 27–31)
MCHC RBC AUTO-ENTMCNC: 33.4 G/DL (ref 32–36)
MCV RBC AUTO: 89 FL (ref 82–98)
MONOCYTES # BLD AUTO: 0.5 K/UL (ref 0.3–1)
MONOCYTES NFR BLD: 6.6 % (ref 4–15)
NEUTROPHILS # BLD AUTO: 3.6 K/UL (ref 1.8–7.7)
NEUTROPHILS NFR BLD: 43.7 % (ref 38–73)
NONHDLC SERPL-MCNC: 233 MG/DL
NRBC BLD-RTO: 0 /100 WBC
PLATELET # BLD AUTO: 276 K/UL (ref 150–450)
PMV BLD AUTO: 10 FL (ref 9.2–12.9)
POTASSIUM SERPL-SCNC: 4.4 MMOL/L (ref 3.5–5.1)
PROT SERPL-MCNC: 7.1 G/DL (ref 6–8.4)
RBC # BLD AUTO: 4.94 M/UL (ref 4–5.4)
SODIUM SERPL-SCNC: 141 MMOL/L (ref 136–145)
T4 FREE SERPL-MCNC: 0.76 NG/DL (ref 0.71–1.51)
TRIGL SERPL-MCNC: 322 MG/DL (ref 30–150)
TSH SERPL DL<=0.005 MIU/L-ACNC: 5.3 UIU/ML (ref 0.4–4)
WBC # BLD AUTO: 8.17 K/UL (ref 3.9–12.7)

## 2022-01-06 PROCEDURE — 80061 LIPID PANEL: CPT | Performed by: INTERNAL MEDICINE

## 2022-01-06 PROCEDURE — 84439 ASSAY OF FREE THYROXINE: CPT | Performed by: INTERNAL MEDICINE

## 2022-01-06 PROCEDURE — 80053 COMPREHEN METABOLIC PANEL: CPT | Performed by: INTERNAL MEDICINE

## 2022-01-06 PROCEDURE — 84443 ASSAY THYROID STIM HORMONE: CPT | Performed by: INTERNAL MEDICINE

## 2022-01-06 PROCEDURE — 36415 COLL VENOUS BLD VENIPUNCTURE: CPT | Performed by: INTERNAL MEDICINE

## 2022-01-06 PROCEDURE — 85025 COMPLETE CBC W/AUTO DIFF WBC: CPT | Performed by: INTERNAL MEDICINE

## 2022-01-07 NOTE — PROGRESS NOTES
Please inform pt -  I reviewed your labs.  Your  Tsh was slightly elevated at 5.297 and your Free T4 was normal at 0.76. Continue your Brand Synthroid 137 mcg 21 tab every day (M-Sat) and take an extra 1/2 tab (1.5 tab) on Sunday. Your cholesterol was high. Have you been missing your Crestor and if so how often?  If not, we need to increase this to 40 mg daily. Continue a low cholesterol diet and exercise.  You can visit the American heart association website at heart.org for further info on a low cholesterol diet.I recommend we repeat your cholesterol in 6 mos.  Your kidney function and liver functions looked good.  You are not anemic. No further recommendations at this time.    Dr. BOTELLO

## 2022-01-09 NOTE — PROGRESS NOTES
"[facility] Note    Chief Complaint      Chief Complaint   Patient presents with    Follow-up       History of Present Illness      Mayelin Chowdary is a 62 y.o. WF with Fatty liver, Hepatic cysts, Left parapelvic cyst, Obesity, and migraines presents to  chronic issues. Referred by Kinza Lieberman. Last visit - 7/12/21    Fatty Liver - Seen on MRI 10/16/18 at [facility]. Rec limit APAP/ETOH.  Rec diet and exercise for wt loss.  D/w pt potential for cirrhosis.      Hepatic Cysts - Seen on MRI 10/16/18 at [facility].      Left parapelvic renal cyst - seen on MRI 10/16/18 at [facility]. Pt never got abd U/S a prev ordered. She defers for now due to pandemic.      Obesity - BMI - 31.0-  up 4lb. she walks most days and plans to start a low carb diet.     HLD - Her cholesterol was high. She thinks she may have been missing some doses of her Crestor 20 mg/d. If remains elev will inc Crestor to 40 mg daily. Continue a low cholesterol diet and exercise.  Pt can visit the American heart association website at heart.org for further info on a low cholesterol diet. I recommend we repeat your cholesterol in 6 mos.        Migraine - HA- Occurring daily the past 2 wks. She cut back on dairy and is on Amitripyline 10 mg/d.   Had Migraines in her 30's and it went away but started again several mos ago. Tylenol, Excedrine migraine, and ibuprofen no help. No photophobia with recent HA. No phonophobia. No aura.  Sometimes wakes up with the HA.  No worse with bending coughing.  No assoc N/V.  Fioricet no help. 7/10 in severity.  She thinks maxalt helped when she was younger. Pt has Imitrex prn. She has not been sleeping well and has been eating late at night.   She has not been taking the Amitriptyline as she ran out.  Will try [id number] mcg/d.     Anxiety -  She has always had flight anxiety. She has taken amitriptyline in past but was not for migraines. "I've got a lot of anxiety".  has been ill. Could consider Venlafaxine " ER. She takes alprazolam prn sparingly.  Cont current for now. Pt prefers prn medication.      Depression - Pt scored 4 on PHQ2 and 13 on PHQ 9 - c/w moderate depression. Could consider Venlafaxine ER.  Doing well.  had to have toe amputated due to melanoma.     Hypothyroidism  -Pt on  Brand Synthroid 137 mcg/day.  TSH was slightly elevated at 5.297 and  Free T4 was normal at 0.76. Continue Brand Synthroid 137 mcg 1 tab every day (M-Sat) and take an extra 1/2 tab (1.5 tab) on Sunday.     Nausea - Resolved.      Carpal tunnel - Numbness tyrel hands - for the past couple mos.  Worse at night. She saw Dr. Kelly for this. She was told to sleep with wrist splints tyrel which helps but even driving or brushing her hair the hands go numb.  Steroid inj helped in past. Doing well.      HCM - Flu - 12/28/21;  Tdap - 10/5/15;  PCV 13 - none;  PVX 23 - none;  Shingrix - none - defers;  COVID -19 vaccine (Moderna) #1 -2/3/21 #2 - 3/3/21; # 3 - 11/29/21;  MGM - 1/14/21 - repeat 1 yr;  DEXA - 11/13/19 - neg - will order next visit; PAP - s/p hys in 20's and unilateral salpingoophorectomy; Hep C Screen - neg -1/12/21;  HIV Screen -  neg -1/12/21 ; C-scope -utd - neg per pt - will obtain copy for review;  Prev PCP - Dr. Mcdermott;  Ob/GYN - Dr. Sotomayor; Rheum - Dr. Kelly; Prev GI - Dr. Valenzuela- Referred to Dr. Reynolds; Well visit - 7/12/21    Patient Care Team:  Lizzie Huff MD as PCP - General (Internal Medicine)  Babs Greenberg MA as Care Coordinator  Ab Valenzuela Jr., MD as Consulting Physician (Gastroenterology)     Health Maintenance:  Immunization History   Administered Date(s) Administered    COVID-19 MRNA, LN-S PF (MODERNA HALF 0.25 ML DOSE) 11/29/2021    COVID-19 Vaccine 02/03/2021, 03/03/2021    Influenza 11/07/2020    Influenza - Quadrivalent 10/29/2019    Influenza - Quadrivalent - PF *Preferred* (6 months and older) 11/08/2018    Influenza - Trivalent (ADULT) 10/17/2008    Influenza -  Trivalent - PF (ADULT) 01/11/2013, 10/05/2015    Tdap 10/05/2015      Health Maintenance   Topic Date Due    Mammogram  01/14/2022    TETANUS VACCINE  10/05/2025    Lipid Panel  01/06/2027    Hepatitis C Screening  Completed        Past Medical History:  Past Medical History:   Diagnosis Date    Anxiety     Gallstones     Hepatic cyst 1/6/2021    High cholesterol     Hypothyroid     Migraines     Renal cyst, left 1/6/2021    Stress incontinence (female) (male) 12/13/2012    Note: Unchanged       Past Surgical History:   has a past surgical history that includes Tubal ligation; Shoulder surgery (Right); Appendectomy (1976); Oophorectomy (1987); LUMPECTOMY,BREAST,WITH RADIOACTIVE SEED LOCALIZATION AND SENTINEL LYMPH NODE BIOPSY (2017); Cholecystectomy (10/01/2020); Partial hysterectomy (1986); bladder lift (2014); and Breast biopsy (Left, 2018).    Family History:  family history includes Alzheimer's disease in her mother; Breast cancer in her maternal aunt and maternal aunt; Diabetes in her father; Lung cancer in her father; Pacemaker/defibrilator in her mother.     Social History:  Social History     Tobacco Use    Smoking status: Never Smoker    Smokeless tobacco: Never Used   Substance Use Topics    Alcohol use: Yes     Comment: social    Drug use: Never       Review of Systems   Constitutional: Negative for chills, diaphoresis and fever.   HENT: Negative for hearing loss.    Eyes: Negative for visual disturbance.   Respiratory: Negative for chest tightness and shortness of breath.    Cardiovascular: Negative for chest pain.   Gastrointestinal: Negative for abdominal pain, constipation and diarrhea.   Endocrine: Negative for polydipsia.   Genitourinary: Negative for bladder incontinence, dysuria and frequency.   Musculoskeletal: Negative for arthralgias and myalgias.   Neurological: Positive for headaches. Negative for dizziness.   Psychiatric/Behavioral: Negative for dysphoric mood. The patient  "is nervous/anxious.         Medications:    Current Outpatient Medications:     ALPRAZolam (XANAX) 0.25 MG tablet, 1 po daily prn anxiety, Disp: 30 tablet, Rfl: 0    FLUCELVAX QUAD [id number], PF, 60 mcg (15 mcg x 4)/0.5 mL Syrg, ADM 0.5ML IM UTD, Disp: , Rfl:     ipratropium (ATROVENT) 42 mcg (0.06 %) nasal spray, 2 sprays by Nasal route 2 (two) times daily., Disp: 15 mL, Rfl: 3    promethazine (PHENERGAN) 25 MG tablet, Take 1 tablet (25 mg total) by mouth every 8 (eight) hours as needed., Disp: 30 tablet, Rfl: 0    rosuvastatin (CRESTOR) 20 MG tablet, Take 1 tablet (20 mg total) by mouth once daily., Disp: 90 tablet, Rfl: 1    sumatriptan (IMITREX) 50 MG tablet, 1 po at onset of migraine and can repeat in 2 hrs - max of 2 in 24 hrs, Disp: 12 tablet, Rfl: 0    amitriptyline (ELAVIL) 25 MG tablet, Take 1 tablet (25 mg total) by mouth nightly as needed for Insomnia., Disp: 30 tablet, Rfl: 5    SYNTHROID 137 mcg Tab tablet, TAKE 1 TABLET BY MOUTH DAILY QM-Sat and 1.5 tab Q sunday, Disp: 90 tablet, Rfl: 0     Allergies:  Review of patient's allergies indicates:   Allergen Reactions    Bactrim [sulfamethoxazole-trimethoprim] Nausea Only       Physical Exam      Vital Signs  Temp: 97.9 °F (36.6 °C)  Pulse: 69  Resp: 20  SpO2: 95 %  BP: 116/78  BP Location: Left arm  Pain Score: 0-No pain  Height and Weight  Height: 5' 2" (157.5 cm)  Weight: 76.9 kg (169 lb 8.5 oz)  BSA (Calculated - sq m): 1.83 sq meters  BMI (Calculated): 31  Weight in (lb) to have BMI = 25: 136.4             Physical Exam  Vitals reviewed.   Constitutional:       General: She is not in acute distress.     Appearance: Normal appearance. She is not ill-appearing, toxic-appearing or diaphoretic.   HENT:      Head: Normocephalic and atraumatic.      Right Ear: Tympanic membrane normal.      Left Ear: Tympanic membrane normal.   Eyes:      Extraocular Movements: Extraocular movements intact.      Conjunctiva/sclera: Conjunctivae normal.      Pupils: " Pupils are equal, round, and reactive to light.   Neck:      Comments: Small nodule near midline just above sternal notch  Cardiovascular:      Rate and Rhythm: Normal rate and regular rhythm.      Pulses: Normal pulses.      Heart sounds: Normal heart sounds. No murmur heard.      Pulmonary:      Effort: No respiratory distress.      Breath sounds: Normal breath sounds. No wheezing.   Abdominal:      General: Bowel sounds are normal. There is no distension.      Palpations: Abdomen is soft.      Tenderness: There is no abdominal tenderness. There is no guarding.   Musculoskeletal:         General: Normal range of motion.   Skin:     General: Skin is warm and dry.   Neurological:      General: No focal deficit present.      Mental Status: She is alert and oriented to person, place, and time.   Psychiatric:         Mood and Affect: Mood normal.         Behavior: Behavior normal.          Laboratory:  CBC:  Recent Labs   Lab 01/12/21  1100 01/12/21  1100 01/06/22  0937   WBC 6.61   < > 8.17   RBC 4.77   < > 4.94   Hemoglobin 13.9   < > 14.6   Hematocrit 43.4   < > 43.7   Platelets 241   < > 276   MCV 91   < > 89   MCH 29.1   < > 29.6   MCHC 32.0  --  33.4    < > = values in this interval not displayed.       CMP:  Recent Labs   Lab 01/12/21  1100 05/13/21  0844 01/06/22  0938   Glucose 103  --  107   Calcium 8.8  --  9.5   Albumin 3.8  --  4.0   Total Protein 6.6  --  7.1   Sodium 140  --  141   Potassium 4.5  --  4.4   CO2 27  --  23   Chloride 104  --  106   BUN 15  --  13   Creatinine 0.8   < > 0.7   Alkaline Phosphatase 66  --  70   ALT 18  --  29   AST 15  --  19   Total Bilirubin 0.2  --  0.4    < > = values in this interval not displayed.       LIPIDS:  Recent Labs   Lab 01/12/21  1100 01/12/21  1100 04/22/21  1051 07/08/21  1044 01/06/22  0937 01/06/22  0938   TSH 0.169 L   < > 0.022 L 1.277 5.297 H  --    HDL 60  --   --   --   --  52   Cholesterol 207 H  --   --   --   --  285 H   Triglycerides 153 H  --    --   --   --  322 H   LDL Cholesterol 116.4  --   --   --   --  168.6 H   HDL/Cholesterol Ratio 29.0  --   --   --   --  18.2 L   Non-HDL Cholesterol 147  --   --   --   --  233   Total Cholesterol/HDL Ratio 3.5  --   --   --   --  5.5 H    < > = values in this interval not displayed.       TSH:  Recent Labs   Lab 04/22/21  1051 07/08/21  1044 01/06/22  0937   TSH 0.022 L 1.277 5.297 H         Recent Labs   Lab 01/12/21  1100   Hepatitis C Ab Negative       Assessment/Plan     Mayelin Chowdary is a 62 y.o.female with:    Hyperlipidemia, unspecified hyperlipidemia type  -     Cancel: Lipid Panel; Future; Expected date: 07/10/2022  -     rosuvastatin (CRESTOR) 20 MG tablet; Take 1 tablet (20 mg total) by mouth once daily.  Dispense: 90 tablet; Refill: 1  -     Lipid Panel; Future; Expected date: 07/10/2022  - Uncontrolled.  She thinks she may have been missing some doses of her Crestor 20 mg/d. If remains elev will inc Crestor to 40 mg daily. Continue a low cholesterol diet and exercise.  Pt can visit the American heart association website at heart.org for further info on a low cholesterol diet. I recommend we repeat her cholesterol in 6 mos.     Other migraine without status migrainosus, not intractable  -     amitriptyline (ELAVIL) 25 MG tablet; Take 1 tablet (25 mg total) by mouth nightly as needed for Insomnia.  Dispense: 30 tablet; Refill: 5  -     promethazine (PHENERGAN) 25 MG tablet; Take 1 tablet (25 mg total) by mouth every 8 (eight) hours as needed.  Dispense: 30 tablet; Refill: 0  - Uncontrolled. Will resume Amitriptyline but inc to 25 mg QHS.     Hypothyroidism, unspecified type  -     SYNTHROID 137 mcg Tab tablet; TAKE 1 TABLET BY MOUTH DAILY QM-Sat and 1.5 tab Q sunday  Dispense: 90 tablet; Refill: 0  -     TSH; Future; Expected date: 02/21/2022  - TSH was slightly elevated at 5.297 and  Free T4 was normal at 0.76. Continue   Brand Synthroid 137 mcg 1 tab every day (M-Sat) and take an extra 1/2 tab (1.5  tab) on Sunday.    Rhinitis, unspecified type  -     ipratropium (ATROVENT) 42 mcg (0.06 %) nasal spray; 2 sprays by Nasal route 2 (two) times daily.  Dispense: 15 mL; Refill: 3    Situational anxiety  -     ALPRAZolam (XANAX) 0.25 MG tablet; 1 po daily prn anxiety  Dispense: 30 tablet; Refill: 0  - She takes alprazolam prn sparingly.  Cont current for now. Pt prefers prn medication.      Fatty liver  - Rec limit APAP/ETOH.  Rec diet and exercise for wt loss.     Disorder of thyroid, unspecified  -     US Thyroid; Future; Expected date: 01/10/2022  - will check U/S thyroid.     Screening mammogram, encounter for  -     Mammo Digital Screening Bilat w/ Solomon; Future; Expected date: 01/10/2022         Chronic conditions status updated as per HPI.  Other than changes above, cont current medications and maintain follow up with specialists.  Follow up in about 6 months (around 7/10/2022) for fu chronic issues or sooner if needed.      Lizzie Huff MD  [facility]

## 2022-01-10 ENCOUNTER — TELEPHONE (OUTPATIENT)
Dept: PRIMARY CARE CLINIC | Facility: CLINIC | Age: 62
End: 2022-01-10
Payer: COMMERCIAL

## 2022-01-10 ENCOUNTER — OFFICE VISIT (OUTPATIENT)
Dept: PRIMARY CARE CLINIC | Facility: CLINIC | Age: 62
End: 2022-01-10
Payer: COMMERCIAL

## 2022-01-10 VITALS
HEIGHT: 62 IN | WEIGHT: 169.56 LBS | DIASTOLIC BLOOD PRESSURE: 78 MMHG | TEMPERATURE: 98 F | BODY MASS INDEX: 31.2 KG/M2 | HEART RATE: 69 BPM | RESPIRATION RATE: 20 BRPM | OXYGEN SATURATION: 95 % | SYSTOLIC BLOOD PRESSURE: 116 MMHG

## 2022-01-10 DIAGNOSIS — E07.9 DISORDER OF THYROID, UNSPECIFIED: ICD-10-CM

## 2022-01-10 DIAGNOSIS — G43.809 OTHER MIGRAINE WITHOUT STATUS MIGRAINOSUS, NOT INTRACTABLE: ICD-10-CM

## 2022-01-10 DIAGNOSIS — F41.8 SITUATIONAL ANXIETY: ICD-10-CM

## 2022-01-10 DIAGNOSIS — E78.5 HYPERLIPIDEMIA, UNSPECIFIED HYPERLIPIDEMIA TYPE: Primary | ICD-10-CM

## 2022-01-10 DIAGNOSIS — Z12.31 SCREENING MAMMOGRAM, ENCOUNTER FOR: ICD-10-CM

## 2022-01-10 DIAGNOSIS — K76.0 FATTY LIVER: ICD-10-CM

## 2022-01-10 DIAGNOSIS — J31.0 RHINITIS, UNSPECIFIED TYPE: ICD-10-CM

## 2022-01-10 DIAGNOSIS — E03.9 HYPOTHYROIDISM, UNSPECIFIED TYPE: ICD-10-CM

## 2022-01-10 PROBLEM — J32.9 SINUSITIS: Status: ACTIVE | Noted: 2022-01-10

## 2022-01-10 PROCEDURE — 3078F PR MOST RECENT DIASTOLIC BLOOD PRESSURE < 80 MM HG: ICD-10-PCS | Mod: CPTII,S$GLB,, | Performed by: INTERNAL MEDICINE

## 2022-01-10 PROCEDURE — 3074F SYST BP LT 130 MM HG: CPT | Mod: CPTII,S$GLB,, | Performed by: INTERNAL MEDICINE

## 2022-01-10 PROCEDURE — 1159F PR MEDICATION LIST DOCUMENTED IN MEDICAL RECORD: ICD-10-PCS | Mod: CPTII,S$GLB,, | Performed by: INTERNAL MEDICINE

## 2022-01-10 PROCEDURE — 3074F PR MOST RECENT SYSTOLIC BLOOD PRESSURE < 130 MM HG: ICD-10-PCS | Mod: CPTII,S$GLB,, | Performed by: INTERNAL MEDICINE

## 2022-01-10 PROCEDURE — 99214 PR OFFICE/OUTPT VISIT, EST, LEVL IV, 30-39 MIN: ICD-10-PCS | Mod: S$GLB,,, | Performed by: INTERNAL MEDICINE

## 2022-01-10 PROCEDURE — 99999 PR PBB SHADOW E&M-EST. PATIENT-LVL IV: ICD-10-PCS | Mod: PBBFAC,,, | Performed by: INTERNAL MEDICINE

## 2022-01-10 PROCEDURE — 1160F RVW MEDS BY RX/DR IN RCRD: CPT | Mod: CPTII,S$GLB,, | Performed by: INTERNAL MEDICINE

## 2022-01-10 PROCEDURE — 3078F DIAST BP <80 MM HG: CPT | Mod: CPTII,S$GLB,, | Performed by: INTERNAL MEDICINE

## 2022-01-10 PROCEDURE — 1160F PR REVIEW ALL MEDS BY PRESCRIBER/CLIN PHARMACIST DOCUMENTED: ICD-10-PCS | Mod: CPTII,S$GLB,, | Performed by: INTERNAL MEDICINE

## 2022-01-10 PROCEDURE — 3008F BODY MASS INDEX DOCD: CPT | Mod: CPTII,S$GLB,, | Performed by: INTERNAL MEDICINE

## 2022-01-10 PROCEDURE — 99214 OFFICE O/P EST MOD 30 MIN: CPT | Mod: S$GLB,,, | Performed by: INTERNAL MEDICINE

## 2022-01-10 PROCEDURE — 99999 PR PBB SHADOW E&M-EST. PATIENT-LVL IV: CPT | Mod: PBBFAC,,, | Performed by: INTERNAL MEDICINE

## 2022-01-10 PROCEDURE — 3008F PR BODY MASS INDEX (BMI) DOCUMENTED: ICD-10-PCS | Mod: CPTII,S$GLB,, | Performed by: INTERNAL MEDICINE

## 2022-01-10 PROCEDURE — 1159F MED LIST DOCD IN RCRD: CPT | Mod: CPTII,S$GLB,, | Performed by: INTERNAL MEDICINE

## 2022-01-10 RX ORDER — ROSUVASTATIN CALCIUM 20 MG/1
20 TABLET, COATED ORAL DAILY
Qty: 90 TABLET | Refills: 1 | Status: SHIPPED | OUTPATIENT
Start: 2022-01-10 | End: 2022-07-14 | Stop reason: SDUPTHER

## 2022-01-10 RX ORDER — LEVOTHYROXINE SODIUM 137 UG/1
TABLET ORAL
Qty: 90 TABLET | Refills: 0 | Status: SHIPPED | OUTPATIENT
Start: 2022-01-10 | End: 2022-11-07

## 2022-01-10 RX ORDER — ALPRAZOLAM 0.25 MG/1
TABLET ORAL
Qty: 30 TABLET | Refills: 0 | Status: SHIPPED | OUTPATIENT
Start: 2022-01-10 | End: 2022-10-31 | Stop reason: SDUPTHER

## 2022-01-10 RX ORDER — IPRATROPIUM BROMIDE 42 UG/1
2 SPRAY, METERED NASAL 2 TIMES DAILY
Qty: 15 ML | Refills: 3 | Status: SHIPPED | OUTPATIENT
Start: 2022-01-10 | End: 2023-12-04 | Stop reason: SDUPTHER

## 2022-01-10 RX ORDER — AMITRIPTYLINE HYDROCHLORIDE 25 MG/1
25 TABLET, FILM COATED ORAL NIGHTLY PRN
Qty: 30 TABLET | Refills: 5 | Status: SHIPPED | OUTPATIENT
Start: 2022-01-10 | End: 2023-12-04 | Stop reason: SDUPTHER

## 2022-01-10 RX ORDER — PROMETHAZINE HYDROCHLORIDE 25 MG/1
25 TABLET ORAL EVERY 8 HOURS PRN
Qty: 30 TABLET | Refills: 0 | Status: SHIPPED | OUTPATIENT
Start: 2022-01-10 | End: 2022-10-31 | Stop reason: SDUPTHER

## 2022-01-10 NOTE — TELEPHONE ENCOUNTER
----- Message from Lizzie Huff MD sent at 1/7/2022  2:42 PM CST -----  Please inform pt -  I reviewed your labs.  Your  Tsh was slightly elevated at 5.297 and your Free T4 was normal at 0.76. Continue your Brand Synthroid 137 mcg 21 tab every day (M-Sat) and take an extra 1/2 tab (1.5 tab) on Sunday. Your cholesterol was high. Have you been missing your Crestor and if so how often?  If not, we need to increase this to 40 mg daily. Continue a low cholesterol diet and exercise.  You can visit the American heart association website at heart.org for further info on a low cholesterol diet.I recommend we repeat your cholesterol in 6 mos.  Your kidney function and liver functions looked good.  You are not anemic. No further recommendations at this time.    Dr. BOTELLO

## 2022-01-18 ENCOUNTER — PATIENT MESSAGE (OUTPATIENT)
Dept: ADMINISTRATIVE | Facility: HOSPITAL | Age: 62
End: 2022-01-18
Payer: COMMERCIAL

## 2022-01-31 ENCOUNTER — HOSPITAL ENCOUNTER (OUTPATIENT)
Dept: RADIOLOGY | Facility: HOSPITAL | Age: 62
Discharge: HOME | End: 2022-01-31
Attending: INTERNAL MEDICINE
Payer: COMMERCIAL

## 2022-01-31 VITALS — BODY MASS INDEX: 30.36 KG/M2 | WEIGHT: 165 LBS | HEIGHT: 62 IN

## 2022-01-31 DIAGNOSIS — E07.9 DISORDER OF THYROID, UNSPECIFIED: ICD-10-CM

## 2022-01-31 DIAGNOSIS — Z12.31 SCREENING MAMMOGRAM, ENCOUNTER FOR: ICD-10-CM

## 2022-01-31 PROCEDURE — 77067 SCR MAMMO BI INCL CAD: CPT | Mod: TC

## 2022-01-31 PROCEDURE — 77063 MAMMO DIGITAL SCREENING BILAT WITH TOMO: ICD-10-PCS | Mod: 26,,, | Performed by: RADIOLOGY

## 2022-01-31 PROCEDURE — 77067 MAMMO DIGITAL SCREENING BILAT WITH TOMO: ICD-10-PCS | Mod: 26,,, | Performed by: RADIOLOGY

## 2022-01-31 PROCEDURE — 77067 SCR MAMMO BI INCL CAD: CPT | Mod: 26,,, | Performed by: RADIOLOGY

## 2022-01-31 PROCEDURE — 77063 BREAST TOMOSYNTHESIS BI: CPT | Mod: TC

## 2022-01-31 PROCEDURE — 76536 US EXAM OF HEAD AND NECK: CPT | Mod: 26,,, | Performed by: RADIOLOGY

## 2022-01-31 PROCEDURE — 76536 US EXAM OF HEAD AND NECK: CPT | Mod: TC

## 2022-01-31 PROCEDURE — 76536 US THYROID: ICD-10-PCS | Mod: 26,,, | Performed by: RADIOLOGY

## 2022-01-31 PROCEDURE — 77063 BREAST TOMOSYNTHESIS BI: CPT | Mod: 26,,, | Performed by: RADIOLOGY

## 2022-02-01 ENCOUNTER — TELEPHONE (OUTPATIENT)
Dept: PRIMARY CARE CLINIC | Facility: CLINIC | Age: 62
End: 2022-02-01
Payer: COMMERCIAL

## 2022-02-01 NOTE — PROGRESS NOTES
I sent pt a my chart message -  I reviewed your MGM -   The breasts are heterogeneously dense, which may obscure small masses. There is no evidence of suspicious masses, microcalcifications or architectural distortion. Stable appearance of biopsy clip in the left breast.  Impression:   No mammographic evidence of malignancy.  Routine screening mammogram in 1 year is recommended.    Dr. BOTELLO

## 2022-02-01 NOTE — TELEPHONE ENCOUNTER
----- Message from Lizzie Huff MD sent at 2/1/2022  6:15 AM CST -----  I sent pt a my chart message -  I reviewed your thyroid Ultrasound. Heterogenous thyroid parenchyma without discrete nodule, similar to prior, and may reflect sequela of chronic/prior thyroiditis. No further intervention necessary at this time.   Dr. BOTELLO

## 2022-02-01 NOTE — PROGRESS NOTES
I sent pt a my chart message -  I reviewed your thyroid Ultrasound. Heterogenous thyroid parenchyma without discrete nodule, similar to prior, and may reflect sequela of chronic/prior thyroiditis. No further intervention necessary at this time.   Dr. BOTELLO

## 2022-02-07 ENCOUNTER — PATIENT MESSAGE (OUTPATIENT)
Dept: PRIMARY CARE CLINIC | Facility: CLINIC | Age: 62
End: 2022-02-07
Payer: COMMERCIAL

## 2022-02-07 DIAGNOSIS — L29.9 PRURITUS: Primary | ICD-10-CM

## 2022-02-07 RX ORDER — FAMOTIDINE 20 MG/1
20 TABLET, FILM COATED ORAL 2 TIMES DAILY
Qty: 60 TABLET | Refills: 11 | Status: SHIPPED | OUTPATIENT
Start: 2022-02-07 | End: 2022-02-07 | Stop reason: SDUPTHER

## 2022-02-07 RX ORDER — HYDROXYZINE HYDROCHLORIDE 25 MG/1
25 TABLET, FILM COATED ORAL 3 TIMES DAILY PRN
Qty: 90 TABLET | Refills: 0 | Status: SHIPPED | OUTPATIENT
Start: 2022-02-07 | End: 2022-02-07 | Stop reason: SDUPTHER

## 2022-02-07 NOTE — TELEPHONE ENCOUNTER
She would have to see an allergist for allergy testing.  I can send her a Rx for Hydroxyzine to take every 8 hrs as needed for itching and also rec she take Famotidine  (Pepcid OTC) 20 mg once-twice daily - this is an acid reduced but helps with hives as it is a different type of antihistamine. I will also place a referral for an allergist so she can get the appropriate testing. If she does not find the hydroxyzine helpful or it makes her too tired she can try Claritin 10 mg twice per day as needed for itching instead.     Dr. BOTELLO

## 2022-02-17 ENCOUNTER — PATIENT MESSAGE (OUTPATIENT)
Dept: PRIMARY CARE CLINIC | Facility: CLINIC | Age: 62
End: 2022-02-17
Payer: COMMERCIAL

## 2022-02-17 NOTE — TELEPHONE ENCOUNTER
Let pt know I do not think based on her description a shingles test is warranted.  Shingles typically affects only one side of the body and is painful and has a blistery rash that crusts. Her diffuse itching without rash is not consistent with this.     Dr. BOTELLO

## 2022-03-14 ENCOUNTER — OFFICE VISIT (OUTPATIENT)
Dept: OBSTETRICS AND GYNECOLOGY | Facility: CLINIC | Age: 62
End: 2022-03-14
Attending: OBSTETRICS & GYNECOLOGY
Payer: COMMERCIAL

## 2022-03-14 VITALS
WEIGHT: 167.75 LBS | SYSTOLIC BLOOD PRESSURE: 112 MMHG | BODY MASS INDEX: 30.87 KG/M2 | HEIGHT: 62 IN | DIASTOLIC BLOOD PRESSURE: 80 MMHG

## 2022-03-14 DIAGNOSIS — Z01.419 ENCOUNTER FOR GYNECOLOGICAL EXAMINATION (GENERAL) (ROUTINE) WITHOUT ABNORMAL FINDINGS: Primary | ICD-10-CM

## 2022-03-14 PROCEDURE — 99999 PR PBB SHADOW E&M-EST. PATIENT-LVL III: CPT | Mod: PBBFAC,,, | Performed by: OBSTETRICS & GYNECOLOGY

## 2022-03-14 PROCEDURE — 1159F MED LIST DOCD IN RCRD: CPT | Mod: CPTII,S$GLB,, | Performed by: OBSTETRICS & GYNECOLOGY

## 2022-03-14 PROCEDURE — 1159F PR MEDICATION LIST DOCUMENTED IN MEDICAL RECORD: ICD-10-PCS | Mod: CPTII,S$GLB,, | Performed by: OBSTETRICS & GYNECOLOGY

## 2022-03-14 PROCEDURE — 3079F DIAST BP 80-89 MM HG: CPT | Mod: CPTII,S$GLB,, | Performed by: OBSTETRICS & GYNECOLOGY

## 2022-03-14 PROCEDURE — 3074F PR MOST RECENT SYSTOLIC BLOOD PRESSURE < 130 MM HG: ICD-10-PCS | Mod: CPTII,S$GLB,, | Performed by: OBSTETRICS & GYNECOLOGY

## 2022-03-14 PROCEDURE — 3008F PR BODY MASS INDEX (BMI) DOCUMENTED: ICD-10-PCS | Mod: CPTII,S$GLB,, | Performed by: OBSTETRICS & GYNECOLOGY

## 2022-03-14 PROCEDURE — 3074F SYST BP LT 130 MM HG: CPT | Mod: CPTII,S$GLB,, | Performed by: OBSTETRICS & GYNECOLOGY

## 2022-03-14 PROCEDURE — 3079F PR MOST RECENT DIASTOLIC BLOOD PRESSURE 80-89 MM HG: ICD-10-PCS | Mod: CPTII,S$GLB,, | Performed by: OBSTETRICS & GYNECOLOGY

## 2022-03-14 PROCEDURE — 99396 PREV VISIT EST AGE 40-64: CPT | Mod: S$GLB,,, | Performed by: OBSTETRICS & GYNECOLOGY

## 2022-03-14 PROCEDURE — 99396 PR PREVENTIVE VISIT,EST,40-64: ICD-10-PCS | Mod: S$GLB,,, | Performed by: OBSTETRICS & GYNECOLOGY

## 2022-03-14 PROCEDURE — 99999 PR PBB SHADOW E&M-EST. PATIENT-LVL III: ICD-10-PCS | Mod: PBBFAC,,, | Performed by: OBSTETRICS & GYNECOLOGY

## 2022-03-14 PROCEDURE — 3008F BODY MASS INDEX DOCD: CPT | Mod: CPTII,S$GLB,, | Performed by: OBSTETRICS & GYNECOLOGY

## 2022-03-14 NOTE — PROGRESS NOTES
Subjective:       Patient ID: Mayelin Chowdary is a 62 y.o. female.    Chief Complaint:  Annual Exam (Last mammogram 2022 birads:1/)        History of Present Illness  Mayelin Chowdary is a 62 y.o. female  who presents for annual. Was on Premarin 0.9 for years and liked it. I tried changing her to Estradiol/Prometrium the last time I saw her and she did not like it. Went back to Premarin, h/o hysterectomy. PCP recommended she stop HRT so she did. She reports that she is having hot flashes and vaginal dryness and would like to go back on Premarin. Rx sent. Discussed in detail. Aware it may not be covered with Medicare next year.     No LMP recorded (lmp unknown). Patient has had a hysterectomy.   Date of Last Pap: No result found    Review of Systems  Review of Systems   Constitutional: Negative for chills and fever.        Objective:   Physical Exam:   Constitutional: She is oriented to person, place, and time. Vital signs are normal. She appears well-developed and well-nourished. No distress.        Pulmonary/Chest: She exhibits no mass. Right breast exhibits no mass, no nipple discharge, no skin change, no tenderness, no bleeding and no swelling. Left breast exhibits no mass, no nipple discharge, no skin change, no tenderness, no bleeding and no swelling. Breasts are symmetrical.        Abdominal: Soft. Bowel sounds are normal. She exhibits no distension and no mass. There is no abdominal tenderness. There is no rebound.     Genitourinary:    Vagina normal.   There is no rash, tenderness, lesion or injury on the right labia. There is no rash, tenderness, lesion or injury on the left labia. Right adnexum displays no mass, no tenderness and no fullness. Left adnexum displays no mass, no tenderness and no fullness. No erythema,  no vaginal discharge, tenderness, rectocele, cystocele or unspecified prolapse of vaginal walls in the vagina. Cervix is absent.Uterus is absent.           Musculoskeletal:  Normal range of motion and moves all extremeties.      Lymphadenopathy:        Right: No supraclavicular adenopathy present.        Left: No supraclavicular adenopathy present.    Neurological: She is alert and oriented to person, place, and time.    Skin: Skin is warm and dry.    Psychiatric: She has a normal mood and affect. Her behavior is normal. Judgment normal.        Assessment/ Plan:     1. Encounter for gynecological examination (general) (routine) without abnormal findings         Follow up in about 1 year (around 3/14/2023) for Annual exam.    As of April 1, 2021, the Cures Act has been passed nationally. This new law requires that all doctors progress notes, lab results, pathology reports and radiology reports be released IMMEDIATELY to the patient in the patient portal. That means that the results are released to you at the EXACT same time they are released to me. Therefore, with all of the patients that I have I am not able to reply to each patient exactly when the results come in. So there will be a delay from when you see the results to when I see them and have time to come up with a response to send you. Also I only see these results when I am on the computer at work. So if the results come in over the weekend or after 5 pm of a work day, I will not see them until the next business day. As you can tell, this is a challenge as a physician to give every patient the quick response they hope for and deserve. So please be patient! Thanks for understanding, Dr. Sotomayor

## 2022-03-15 ENCOUNTER — PATIENT MESSAGE (OUTPATIENT)
Dept: OBSTETRICS AND GYNECOLOGY | Facility: CLINIC | Age: 62
End: 2022-03-15
Payer: COMMERCIAL

## 2022-03-15 DIAGNOSIS — Z78.0 MENOPAUSE: Primary | ICD-10-CM

## 2022-06-01 LAB — CRC RECOMMENDATION EXT: NORMAL

## 2022-06-14 ENCOUNTER — PATIENT OUTREACH (OUTPATIENT)
Dept: ADMINISTRATIVE | Facility: HOSPITAL | Age: 62
End: 2022-06-14
Payer: COMMERCIAL

## 2022-06-14 NOTE — PROGRESS NOTES
 Shingles Vaccine (1 of 2)    COVID-19 Vaccine (3 - Mixed Product risk series)     Received egd/colonoscopy records  Scanned to media.  updated    Immunizations: reviewed and updated  Care Everywhere: triggered  Care Teams: up to date  Outreach: none needed

## 2022-07-13 ENCOUNTER — LAB VISIT (OUTPATIENT)
Dept: LAB | Facility: HOSPITAL | Age: 62
End: 2022-07-13
Attending: INTERNAL MEDICINE
Payer: COMMERCIAL

## 2022-07-13 DIAGNOSIS — E78.5 HYPERLIPIDEMIA, UNSPECIFIED HYPERLIPIDEMIA TYPE: ICD-10-CM

## 2022-07-13 LAB
CHOLEST SERPL-MCNC: 271 MG/DL (ref 120–199)
CHOLEST/HDLC SERPL: 5.1 {RATIO} (ref 2–5)
HDLC SERPL-MCNC: 53 MG/DL (ref 40–75)
HDLC SERPL: 19.6 % (ref 20–50)
LDLC SERPL CALC-MCNC: 162.4 MG/DL (ref 63–159)
NONHDLC SERPL-MCNC: 218 MG/DL
TRIGL SERPL-MCNC: 278 MG/DL (ref 30–150)

## 2022-07-13 PROCEDURE — 36415 COLL VENOUS BLD VENIPUNCTURE: CPT | Performed by: INTERNAL MEDICINE

## 2022-07-13 PROCEDURE — 80061 LIPID PANEL: CPT | Performed by: INTERNAL MEDICINE

## 2022-07-14 DIAGNOSIS — E78.5 HYPERLIPIDEMIA, UNSPECIFIED HYPERLIPIDEMIA TYPE: Primary | ICD-10-CM

## 2022-07-14 RX ORDER — ROSUVASTATIN CALCIUM 40 MG/1
40 TABLET, COATED ORAL DAILY
Qty: 90 TABLET | Refills: 1 | Status: SHIPPED | OUTPATIENT
Start: 2022-07-14 | End: 2023-07-31 | Stop reason: SDUPTHER

## 2022-07-14 NOTE — PROGRESS NOTES
I sent pt a my chart message -  I reviewed your labs.  Your cholesterol was high. I will inc your Crestor to 40 mg daily. Continue a low cholesterol diet and exercise.  You can visit the American heart association website at heart.org for further info on a low cholesterol diet. I recommend we repeat your cholesterol in 6 mos.  No further recommendations at this time.    Dr. BOTELLO

## 2022-07-25 ENCOUNTER — PATIENT MESSAGE (OUTPATIENT)
Dept: PRIMARY CARE CLINIC | Facility: CLINIC | Age: 62
End: 2022-07-25
Payer: COMMERCIAL

## 2022-07-26 ENCOUNTER — IMMUNIZATION (OUTPATIENT)
Dept: PHARMACY | Facility: CLINIC | Age: 62
End: 2022-07-26
Payer: COMMERCIAL

## 2022-07-26 DIAGNOSIS — Z23 NEED FOR VACCINATION: Primary | ICD-10-CM

## 2022-10-30 NOTE — PROGRESS NOTES
[facility] Note    Chief Complaint      Chief Complaint   Patient presents with    Annual Exam       History of Present Illness      Mayelin Chowdary is a 62 y.o.  WF with Fatty liver, Hepatic cysts, Left parapelvic cyst, Obesity, and migraines presents to fu chronic issues. Referred by Kinza Lieberman. Last visit - 1/10/22    Pruritis - resolved. Rx Hydroxyzine to take every 8 hrs as needed for itching and also rec she take Famotidine  (Pepcid OTC) 20 mg once-twice daily - this is an acid reduced but helps with hives as it is a different type of antihistamine.  If she does not find the hydroxyzine helpful or it makes her too tired she can try Claritin 10 mg twice per day as needed for itching instead. She fu with Derm, Dr. [facility], and A/I, Dr. Santana. Allergy testing was neg. Per pt. She reports itching after taking Tylenol so she avoids it.      Fatty Liver - Seen on MRI 10/16/18 at [facility]. Rec limit APAP/ETOH.  Rec diet and exercise for wt loss.  D/w pt potential for cirrhosis. Repeat Abd U/S in future if needed.      Hepatic Cysts - Seen on MRI 10/16/18 at [facility].      Left parapelvic renal cyst - seen on MRI 10/16/18 at [facility]. Pt never got abd U/S a prev ordered. She defers for now due to pandemic.      Obesity - BMI - 30.1-  up 5lb. She walks most days and plans to start a low carb diet.      HLD - Cholesterol was high. I inc your Crestor to 40 mg daily. Continue a low cholesterol diet and exercise. Pt can visit the American heart association website at heart.org for further info on a low cholesterol diet. I recommend we repeat your cholesterol in 6 mos.        Migraine - HA- Occurring daily the past 2 wks. She cut back on dairy and is on Amitripyline 10 mg/d.   Had Migraines in her 30's and it went away but started again several mos ago. Tylenol, Excedrine migraine, and ibuprofen no help. No photophobia with recent HA. No phonophobia. No aura.  Sometimes wakes up with the HA.  No worse with  "bending coughing.  No assoc N/V.  Fioricet no help. 7/10 in severity.  She thinks maxalt helped when she was younger. Pt has Imitrex prn. She has not been sleeping well and has been eating late at night.   Pt on amitriptyline 25 mcg/d - no change in sleep but HA controlled.       Anxiety -  She has always had flight anxiety. She has taken amitriptyline in past but was not for migraines. "I've got a lot of anxiety".  has been ill. Could consider Venlafaxine ER. She takes alprazolam prn sparingly.   is about to have surgery. She gets anxious sometimes. Cont current for now. Pt prefers prn medication.      Depression - Pt scored 4 on PHQ2 and 13 on PHQ 9 - c/w moderate depression. Could consider Venlafaxine ER.  Doing well.  had to have toe amputated due to melanoma.     Hypothyroidism  -Continue your Brand Synthroid 137 mcg 21 tab every day (M-Sat) and take an extra 1/2 tab (1.5 tab) on Sunday.   Thyroid U/S - 1/31/22 -  thyroid Ultrasound. Heterogenous thyroid parenchyma without discrete nodule, similar to prior, and may reflect sequela of chronic/prior thyroiditis.     Carpal tunnel - Numbness tyrel hands - for the past couple mos.  Worse at night. She saw Dr. Kelly for this. She was told to sleep with wrist splints tyrel which helps but even driving or brushing her hair the hands go numb.  Steroid inj helped in past. Doing well s/p recent injections. Could consider EMG in future and referral back to Dr. Monzon in future if needed.     Elevated BP - Return for repeat BP with nurse visit in 2-4 wks.      HCM - Flu - 10/13/21;  Tdap - 10/5/15;  PCV 13 - none;  PVX 23 - none;  Shingrix - none - defers;  COVID -19 vaccine (Moderna) #1 -2/3/21 #2 - 3/3/21; # 3 - 11/29/21; # 4 7/26/22; # 5 ; MGM - 1/31/22- repeat 1 yr;  DEXA - 11/13/19 - neg - will order next visit; PAP - s/p hys in 20's and unilateral salpingoophorectomy; Hep C Screen - neg -1/12/21;  HIV Screen -  neg -1/12/21 ; C-scope -utd - neg " per pt - will obtain copy for review;  Prev PCP - Dr. Mcdermott;  Ob/GYN - Dr. Sotomayor; Rheum - Dr. Kelly; Prev GI - Dr. Valenzuela- Referred to Dr. Reynolds; ENT - Dr. Toscano; Hand Sx - Dr. Monzon;  Well visit - 7/14/22 - with Dr. Gaming     Patient Care Team:  Lizzie Huff MD as PCP - General (Internal Medicine)  Babs Greenberg MA as Care Coordinator  Ab Valenzuela Jr., MD as Consulting Physician (Gastroenterology)     Health Maintenance:  Immunization History   Administered Date(s) Administered    COVID-19 MRNA, LN-S PF (MODERNA HALF 0.25 ML DOSE) 11/29/2021, 07/26/2022    COVID-19 Vaccine 02/03/2021, 03/03/2021    Influenza 11/07/2020    Influenza - Quadrivalent 10/29/2019    Influenza - Quadrivalent - MDCK - PF 12/28/2021    Influenza - Quadrivalent - PF *Preferred* (6 months and older) 11/08/2018, 10/31/2022    Influenza - Trivalent (ADULT) 10/17/2008    Influenza - Trivalent - PF (ADULT) 01/11/2013, 10/05/2015    Tdap 10/05/2015      Health Maintenance   Topic Date Due    Mammogram  01/31/2023    TETANUS VACCINE  10/05/2025    Lipid Panel  07/13/2027    Hepatitis C Screening  Completed    DEXA Scan  Discontinued        Past Medical History:  Past Medical History:   Diagnosis Date    Anxiety     Gallstones     Hepatic cyst 1/6/2021    High cholesterol     Hypothyroid     Migraines     Renal cyst, left 1/6/2021    Stress incontinence (female) (male) 12/13/2012    Note: Unchanged       Past Surgical History:   has a past surgical history that includes Tubal ligation; Shoulder surgery (Right); Appendectomy (1976); Oophorectomy (1987); LUMPECTOMY,BREAST,WITH RADIOACTIVE SEED LOCALIZATION AND SENTINEL LYMPH NODE BIOPSY (2017); Cholecystectomy (10/01/2020); Partial hysterectomy (1986); bladder lift (2014); and Breast biopsy (Left, 2018).    Family History:  family history includes Alzheimer's disease in her mother; Breast cancer in her maternal aunt and maternal aunt; Diabetes in her  father; Lung cancer in her father; Pacemaker/defibrilator in her mother.     Social History:  Social History     Tobacco Use    Smoking status: Never    Smokeless tobacco: Never   Substance Use Topics    Alcohol use: Not Currently     Comment: social    Drug use: Never       Review of Systems   Constitutional:  Positive for diaphoresis. Negative for chills and fever.   HENT:  Negative for hearing loss.    Eyes:  Negative for visual disturbance.   Respiratory:  Negative for chest tightness and shortness of breath.    Cardiovascular:  Positive for palpitations. Negative for chest pain.        With anxiety   Gastrointestinal:  Positive for abdominal pain and diarrhea. Negative for constipation, nausea and vomiting.        Only if eats gluten which she huy to avoid   Endocrine: Positive for heat intolerance. Negative for cold intolerance.   Genitourinary:  Negative for bladder incontinence, dysuria and frequency.   Musculoskeletal:  Negative for arthralgias and myalgias.   Neurological:  Positive for weakness and numbness.        In hands   Psychiatric/Behavioral:  Positive for sleep disturbance. Negative for dysphoric mood. The patient is nervous/anxious.         Worried about .        Medications:    Current Outpatient Medications:     amitriptyline (ELAVIL) 25 MG tablet, Take 1 tablet (25 mg total) by mouth nightly as needed for Insomnia., Disp: 30 tablet, Rfl: 5    chlordiazepoxide-clidinium 5-2.5 mg (LIBRAX) 5-2.5 mg Cap, Take 1 capsule by mouth 2 (two) times daily as needed., Disp: , Rfl:     hydrOXYzine HCL (ATARAX) 25 MG tablet, Take 1 tablet (25 mg total) by mouth 3 (three) times daily as needed for Itching., Disp: 90 tablet, Rfl: 0    ipratropium (ATROVENT) 42 mcg (0.06 %) nasal spray, 2 sprays by Nasal route 2 (two) times daily., Disp: 15 mL, Rfl: 3    rosuvastatin (CRESTOR) 40 MG Tab, Take 1 tablet (40 mg total) by mouth once daily., Disp: 90 tablet, Rfl: 1    SYNTHROID 137 mcg Tab tablet, TAKE 1  "TABLET BY MOUTH DAILY QM-Sat and 1.5 tab Q sunday, Disp: 90 tablet, Rfl: 0    ALPRAZolam (XANAX) 0.25 MG tablet, 1 po daily prn anxiety, Disp: 30 tablet, Rfl: 0    flu vacc [id number] 6mos up,PF, 60 mcg (15 mcg x 4)/0.5 mL Syrg, Inject 0.5 mLs into the muscle once. for 1 dose, Disp: 0.5 mL, Rfl: 0    promethazine (PHENERGAN) 25 MG tablet, Take 1 tablet (25 mg total) by mouth every 8 (eight) hours as needed., Disp: 30 tablet, Rfl: 0    sumatriptan (IMITREX) 50 MG tablet, 1 po at onset of migraine and can repeat in 2 hrs - max of 2 in 24 hrs, Disp: 12 tablet, Rfl: 0     Allergies:  Review of patient's allergies indicates:   Allergen Reactions    Bactrim [sulfamethoxazole-trimethoprim] Nausea Only       Physical Exam      Vital Signs  Temp: 97.8 °F (36.6 °C)  Pulse: (!) 56  Resp: 16  SpO2: 98 %  BP: (!) 150/88  BP Location: Right arm  Pain Score: 0-No pain  Height and Weight  Height: 5' 2" (157.5 cm)  Weight: 74.7 kg (164 lb 10.9 oz)  BSA (Calculated - sq m): 1.81 sq meters  BMI (Calculated): 30.1  Weight in (lb) to have BMI = 25: 136.4             Physical Exam  Vitals reviewed.   Constitutional:       General: She is not in acute distress.     Appearance: Normal appearance. She is not ill-appearing, toxic-appearing or diaphoretic.   HENT:      Head: Normocephalic and atraumatic.      Right Ear: Tympanic membrane normal.      Left Ear: Tympanic membrane normal.   Eyes:      Extraocular Movements: Extraocular movements intact.      Conjunctiva/sclera: Conjunctivae normal.      Pupils: Pupils are equal, round, and reactive to light.   Neck:      Vascular: No carotid bruit.   Cardiovascular:      Rate and Rhythm: Normal rate and regular rhythm.      Pulses: Normal pulses.      Heart sounds: Normal heart sounds. No murmur heard.  Pulmonary:      Effort: No respiratory distress.      Breath sounds: Normal breath sounds. No wheezing.   Abdominal:      General: Bowel sounds are normal. There is no distension.      Palpations: " Abdomen is soft.      Tenderness: There is no abdominal tenderness. There is no guarding or rebound.   Neurological:      General: No focal deficit present.      Mental Status: She is alert and oriented to person, place, and time.   Psychiatric:         Mood and Affect: Mood normal.         Behavior: Behavior normal.        Laboratory:  CBC:  Recent Labs   Lab 01/12/21  1100 01/06/22  0937   WBC 6.61 8.17   RBC 4.77 4.94   Hemoglobin 13.9 14.6   Hematocrit 43.4 43.7   Platelets 241 276   MCV 91 89   MCH 29.1 29.6   MCHC 32.0 33.4       CMP:  Recent Labs   Lab 01/12/21  1100 05/13/21  0844 01/06/22  0938   Glucose 103  --  107   Calcium 8.8  --  9.5   Albumin 3.8  --  4.0   Total Protein 6.6  --  7.1   Sodium 140  --  141   Potassium 4.5  --  4.4   CO2 27  --  23   Chloride 104  --  106   BUN 15  --  13   Creatinine 0.8   < > 0.7   Alkaline Phosphatase 66  --  70   ALT 18  --  29   AST 15  --  19   Total Bilirubin 0.2  --  0.4    < > = values in this interval not displayed.       LIPIDS:  Recent Labs   Lab 01/12/21  1100 04/22/21  1051 07/08/21  1044 01/06/22  0937 01/06/22  0938 07/13/22  1000   TSH 0.169 L 0.022 L 1.277 5.297 H  --   --    HDL 60  --   --   --  52 53   Cholesterol 207 H  --   --   --  285 H 271 H   Triglycerides 153 H  --   --   --  322 H 278 H   LDL Cholesterol 116.4  --   --   --  168.6 H 162.4 H   HDL/Cholesterol Ratio 29.0  --   --   --  18.2 L 19.6 L   Non-HDL Cholesterol 147  --   --   --  233 218   Total Cholesterol/HDL Ratio 3.5  --   --   --  5.5 H 5.1 H       TSH:  Recent Labs   Lab 04/22/21  1051 07/08/21  1044 01/06/22  0937   TSH 0.022 L 1.277 5.297 H         Recent Labs   Lab 01/12/21  1100   Hepatitis C Ab Negative       Assessment/Plan     Mayelin Chowdary is a 62 y.o.female with:    Hyperlipidemia, unspecified hyperlipidemia type  -     Lipid Panel; Future; Expected date: 10/31/2022  -     LIPOPROTEIN A (LPA); Future; Expected date: 10/31/2022  - Cont Crestor 40 mg QHS. Check  Lipoprotein a    Hypothyroidism, unspecified type  -     TSH; Future; Expected date: 10/31/2022  -     T4, Free; Future; Expected date: 10/31/2022  - Stable.  Cont current regimen.    IFG (impaired fasting glucose)  -     HEMOGLOBIN A1C; Future; Expected date: 10/31/2022  - Check Ha1c.     Other migraine without status migrainosus, not intractable  -     sumatriptan (IMITREX) 50 MG tablet; 1 po at onset of migraine and can repeat in 2 hrs - max of 2 in 24 hrs  Dispense: 12 tablet; Refill: 0  -     promethazine (PHENERGAN) 25 MG tablet; Take 1 tablet (25 mg total) by mouth every 8 (eight) hours as needed.  Dispense: 30 tablet; Refill: 0  - Stable.  Cont current regimen.    Situational anxiety  -     ALPRAZolam (XANAX) 0.25 MG tablet; 1 po daily prn anxiety  Dispense: 30 tablet; Refill: 0  - Stable.  Cont current regimen.    Fatty liver  -     US Abdomen Complete; Future; Expected date: 10/31/2022  - Repeat limit tylenol and alcohol.  Rec diet and exercise for wt loss.  As discussed at visit there is a potential for cirrhosis.      Postmenopausal  -     DXA Bone Density Spine And Hip; Future; Expected date: 01/01/2023    Bilateral carpal tunnel syndrome  - Stable.  If worsens rec fu again with Dr. Monzon and rec EMG    Renal cyst, left  -     US Abdomen Complete; Future; Expected date: 10/31/2022  - Repeat Abd U/S.     Elevated blood-pressure reading without diagnosis of hypertension  - Return for nurse BP check in 2-4 wks.     Insomnia, unspecified type  - Cont amitriptyline.     Screening mammogram, encounter for  -     Mammo Digital Screening Bilat w/ Solomon; Future; Expected date: 10/31/2022    Need for prophylactic vaccination and inoculation against influenza  -     Flu Vaccine - Quadrivalent *Preferred* (PF) (6 months & older)  - Admin today      Chronic conditions status updated as per HPI.  Other than changes above, cont current medications and maintain follow up with specialists.  Follow up in about 6  months (around 4/30/2023) for fu chronic issues or sooner if needed.      Lizzie Huff MD  [facility]

## 2022-10-31 ENCOUNTER — OFFICE VISIT (OUTPATIENT)
Dept: PRIMARY CARE CLINIC | Facility: CLINIC | Age: 62
End: 2022-10-31
Payer: COMMERCIAL

## 2022-10-31 VITALS
OXYGEN SATURATION: 98 % | WEIGHT: 164.69 LBS | TEMPERATURE: 98 F | HEART RATE: 56 BPM | SYSTOLIC BLOOD PRESSURE: 150 MMHG | BODY MASS INDEX: 30.31 KG/M2 | DIASTOLIC BLOOD PRESSURE: 88 MMHG | HEIGHT: 62 IN | RESPIRATION RATE: 16 BRPM

## 2022-10-31 DIAGNOSIS — Z12.31 SCREENING MAMMOGRAM, ENCOUNTER FOR: ICD-10-CM

## 2022-10-31 DIAGNOSIS — G43.809 OTHER MIGRAINE WITHOUT STATUS MIGRAINOSUS, NOT INTRACTABLE: ICD-10-CM

## 2022-10-31 DIAGNOSIS — Z23 NEED FOR PROPHYLACTIC VACCINATION AND INOCULATION AGAINST INFLUENZA: ICD-10-CM

## 2022-10-31 DIAGNOSIS — G56.03 BILATERAL CARPAL TUNNEL SYNDROME: ICD-10-CM

## 2022-10-31 DIAGNOSIS — G47.00 INSOMNIA, UNSPECIFIED TYPE: ICD-10-CM

## 2022-10-31 DIAGNOSIS — R03.0 ELEVATED BLOOD-PRESSURE READING WITHOUT DIAGNOSIS OF HYPERTENSION: ICD-10-CM

## 2022-10-31 DIAGNOSIS — N28.1 RENAL CYST, LEFT: ICD-10-CM

## 2022-10-31 DIAGNOSIS — E78.5 HYPERLIPIDEMIA, UNSPECIFIED HYPERLIPIDEMIA TYPE: Primary | ICD-10-CM

## 2022-10-31 DIAGNOSIS — E03.9 HYPOTHYROIDISM, UNSPECIFIED TYPE: ICD-10-CM

## 2022-10-31 DIAGNOSIS — F41.8 SITUATIONAL ANXIETY: ICD-10-CM

## 2022-10-31 DIAGNOSIS — Z78.0 POSTMENOPAUSAL: ICD-10-CM

## 2022-10-31 DIAGNOSIS — R73.01 IFG (IMPAIRED FASTING GLUCOSE): ICD-10-CM

## 2022-10-31 DIAGNOSIS — K76.0 FATTY LIVER: ICD-10-CM

## 2022-10-31 PROCEDURE — 1159F MED LIST DOCD IN RCRD: CPT | Mod: CPTII,S$GLB,, | Performed by: INTERNAL MEDICINE

## 2022-10-31 PROCEDURE — 90471 FLU VACCINE (QUAD) GREATER THAN OR EQUAL TO 3YO PRESERVATIVE FREE IM: ICD-10-PCS | Mod: S$GLB,,, | Performed by: INTERNAL MEDICINE

## 2022-10-31 PROCEDURE — 1159F PR MEDICATION LIST DOCUMENTED IN MEDICAL RECORD: ICD-10-PCS | Mod: CPTII,S$GLB,, | Performed by: INTERNAL MEDICINE

## 2022-10-31 PROCEDURE — 90471 IMMUNIZATION ADMIN: CPT | Mod: S$GLB,,, | Performed by: INTERNAL MEDICINE

## 2022-10-31 PROCEDURE — 99214 PR OFFICE/OUTPT VISIT, EST, LEVL IV, 30-39 MIN: ICD-10-PCS | Mod: 25,S$GLB,, | Performed by: INTERNAL MEDICINE

## 2022-10-31 PROCEDURE — 99214 OFFICE O/P EST MOD 30 MIN: CPT | Mod: 25,S$GLB,, | Performed by: INTERNAL MEDICINE

## 2022-10-31 PROCEDURE — 90686 FLU VACCINE (QUAD) GREATER THAN OR EQUAL TO 3YO PRESERVATIVE FREE IM: ICD-10-PCS | Mod: S$GLB,,, | Performed by: INTERNAL MEDICINE

## 2022-10-31 PROCEDURE — 3077F SYST BP >= 140 MM HG: CPT | Mod: CPTII,S$GLB,, | Performed by: INTERNAL MEDICINE

## 2022-10-31 PROCEDURE — 99999 PR PBB SHADOW E&M-EST. PATIENT-LVL V: ICD-10-PCS | Mod: PBBFAC,,, | Performed by: INTERNAL MEDICINE

## 2022-10-31 PROCEDURE — 3079F DIAST BP 80-89 MM HG: CPT | Mod: CPTII,S$GLB,, | Performed by: INTERNAL MEDICINE

## 2022-10-31 PROCEDURE — 99999 PR PBB SHADOW E&M-EST. PATIENT-LVL V: CPT | Mod: PBBFAC,,, | Performed by: INTERNAL MEDICINE

## 2022-10-31 PROCEDURE — 90686 IIV4 VACC NO PRSV 0.5 ML IM: CPT | Mod: S$GLB,,, | Performed by: INTERNAL MEDICINE

## 2022-10-31 PROCEDURE — 3077F PR MOST RECENT SYSTOLIC BLOOD PRESSURE >= 140 MM HG: ICD-10-PCS | Mod: CPTII,S$GLB,, | Performed by: INTERNAL MEDICINE

## 2022-10-31 PROCEDURE — 3079F PR MOST RECENT DIASTOLIC BLOOD PRESSURE 80-89 MM HG: ICD-10-PCS | Mod: CPTII,S$GLB,, | Performed by: INTERNAL MEDICINE

## 2022-10-31 RX ORDER — ALPRAZOLAM 0.25 MG/1
TABLET ORAL
Qty: 30 TABLET | Refills: 0 | Status: SHIPPED | OUTPATIENT
Start: 2022-10-31 | End: 2023-07-05

## 2022-10-31 RX ORDER — PROMETHAZINE HYDROCHLORIDE 25 MG/1
25 TABLET ORAL EVERY 8 HOURS PRN
Qty: 30 TABLET | Refills: 0 | Status: SHIPPED | OUTPATIENT
Start: 2022-10-31 | End: 2023-12-04 | Stop reason: SDUPTHER

## 2022-10-31 RX ORDER — CHLORDIAZEPOXIDE HYDROCHLORIDE AND CLIDINIUM BROMIDE 5; 2.5 MG/1; MG/1
1 CAPSULE ORAL 2 TIMES DAILY PRN
COMMUNITY
Start: 2022-07-16

## 2022-10-31 RX ORDER — SUMATRIPTAN 50 MG/1
TABLET, FILM COATED ORAL
Qty: 12 TABLET | Refills: 0 | Status: SHIPPED | OUTPATIENT
Start: 2022-10-31 | End: 2024-04-02

## 2022-10-31 NOTE — PROGRESS NOTES
Two patient identifiers verified.    Allergies reviewed.    Influenza Vaccine administered IM to Right Deltoid per MD order.    Patient tolerated injection well; no redness, bleeding, or bruising noted to injection site.    Patient instructed to remain in clinic setting for 15 minutes. Verbalizes understanding.

## 2022-11-03 ENCOUNTER — LAB VISIT (OUTPATIENT)
Dept: LAB | Facility: HOSPITAL | Age: 62
End: 2022-11-03
Attending: INTERNAL MEDICINE
Payer: COMMERCIAL

## 2022-11-03 DIAGNOSIS — E03.9 HYPOTHYROIDISM, UNSPECIFIED TYPE: ICD-10-CM

## 2022-11-03 DIAGNOSIS — R73.01 IFG (IMPAIRED FASTING GLUCOSE): ICD-10-CM

## 2022-11-03 DIAGNOSIS — E78.5 HYPERLIPIDEMIA, UNSPECIFIED HYPERLIPIDEMIA TYPE: ICD-10-CM

## 2022-11-03 LAB
CHOLEST SERPL-MCNC: 283 MG/DL (ref 120–199)
CHOLEST/HDLC SERPL: 6.4 {RATIO} (ref 2–5)
ESTIMATED AVG GLUCOSE: 111 MG/DL (ref 68–131)
HBA1C MFR BLD: 5.5 % (ref 4–5.6)
HDLC SERPL-MCNC: 44 MG/DL (ref 40–75)
HDLC SERPL: 15.5 % (ref 20–50)
LDLC SERPL CALC-MCNC: 197.8 MG/DL (ref 63–159)
NONHDLC SERPL-MCNC: 239 MG/DL
T4 FREE SERPL-MCNC: 0.76 NG/DL (ref 0.71–1.51)
TRIGL SERPL-MCNC: 206 MG/DL (ref 30–150)
TSH SERPL DL<=0.005 MIU/L-ACNC: 5.68 UIU/ML (ref 0.4–4)

## 2022-11-03 PROCEDURE — 84443 ASSAY THYROID STIM HORMONE: CPT | Performed by: INTERNAL MEDICINE

## 2022-11-03 PROCEDURE — 83695 ASSAY OF LIPOPROTEIN(A): CPT | Performed by: INTERNAL MEDICINE

## 2022-11-03 PROCEDURE — 36415 COLL VENOUS BLD VENIPUNCTURE: CPT | Performed by: INTERNAL MEDICINE

## 2022-11-03 PROCEDURE — 83036 HEMOGLOBIN GLYCOSYLATED A1C: CPT | Performed by: INTERNAL MEDICINE

## 2022-11-03 PROCEDURE — 84439 ASSAY OF FREE THYROXINE: CPT | Performed by: INTERNAL MEDICINE

## 2022-11-03 PROCEDURE — 80061 LIPID PANEL: CPT | Performed by: INTERNAL MEDICINE

## 2022-11-07 ENCOUNTER — TELEPHONE (OUTPATIENT)
Dept: PRIMARY CARE CLINIC | Facility: CLINIC | Age: 62
End: 2022-11-07
Payer: COMMERCIAL

## 2022-11-07 ENCOUNTER — HOSPITAL ENCOUNTER (OUTPATIENT)
Dept: RADIOLOGY | Facility: HOSPITAL | Age: 62
Discharge: HOME | End: 2022-11-07
Attending: INTERNAL MEDICINE
Payer: COMMERCIAL

## 2022-11-07 DIAGNOSIS — K76.9 LIVER DISEASE, UNSPECIFIED: Primary | ICD-10-CM

## 2022-11-07 DIAGNOSIS — R16.0 LIVER MASS: ICD-10-CM

## 2022-11-07 DIAGNOSIS — K76.0 FATTY LIVER: ICD-10-CM

## 2022-11-07 DIAGNOSIS — E03.9 HYPOTHYROIDISM, UNSPECIFIED TYPE: ICD-10-CM

## 2022-11-07 DIAGNOSIS — N28.1 RENAL CYST, LEFT: ICD-10-CM

## 2022-11-07 PROCEDURE — 76700 US EXAM ABDOM COMPLETE: CPT | Mod: TC

## 2022-11-07 PROCEDURE — 76700 US ABDOMEN COMPLETE: ICD-10-PCS | Mod: 26,,, | Performed by: RADIOLOGY

## 2022-11-07 PROCEDURE — 76700 US EXAM ABDOM COMPLETE: CPT | Mod: 26,,, | Performed by: RADIOLOGY

## 2022-11-07 RX ORDER — DIAZEPAM 5 MG/1
TABLET ORAL
Qty: 1 TABLET | Refills: 0 | Status: SHIPPED | OUTPATIENT
Start: 2022-11-07 | End: 2023-07-31

## 2022-11-07 RX ORDER — LEVOTHYROXINE SODIUM 150 MCG
150 TABLET ORAL
Qty: 90 TABLET | Refills: 0 | Status: SHIPPED | OUTPATIENT
Start: 2022-11-07 | End: 2023-01-26

## 2022-11-07 NOTE — PROGRESS NOTES
I d/w pt -  I reviewed your Abdominal Ultrasound which showed Solid hypoechoic lesion in the right hepatic lobe, measures 1.2 x 0.8 x 0.9 cm.  Few hepatic cysts, the largest measures 2.3 cm.   I rec an MRI with Liver protocol to further evaluate this lesion and will refer you to hepatology. Fatty Liver. I rec you limit tylenol and alcohol.  Rec diet and exercise for wt loss.  As discussed at visit there is a potential for cirrhosis. Probable left renal sinus cysts, the largest measures 1.4 cm.  No definite hydronephrosis. She is claustrophobic and did well in past on Valium prior to an MRI.  Will send Valium 5 mg #1. She will have someone drive her the day of the procedure. She reports yrs ago she was told he had a hemangioma on prev imaging at [facility] > 10 yrs ago.     Dr. BOTELLO

## 2022-11-07 NOTE — TELEPHONE ENCOUNTER
Pt was informed and expressed understanding. States she may miss her crestor 2 times per week. States she will work to get better with this. Defers zetia at this time. States she had an u/s today which shows a liver lesion which concerns her

## 2022-11-07 NOTE — PROGRESS NOTES
I sent pt a my chart Please inform pt -   I reviewed your  labs.  Your Ha1c was normal at 5.5.Your TSH remains slightly elevated on your current dose of  Brand Synthroid 137 mcg 21 tab every day (M-Sat) and take an extra 1/2 tab (1.5 tab) on Sunday.  I rec we increase your regimen to 150 mcg daily and repeat your thyroid functions in 6 wks.   Your Cholesterol looked very high despite being on Cretor 40 mg daily.  Do you miss doses and if so how often.  We  could consider adding another cholesterol medication called Zetia to your regimen.  Let me know your thoughts and I can send to the pharmacy.   You would need to alert me if you notice any muscle aches or weakness. Sometimes when we see a cholesterol as high as yours we like to screen for a genetic disorder called familial hypercholesterolemia.   We can further discuss this at your next visit.  No further recommendations at this time.    Dr. BOTELLO

## 2022-11-07 NOTE — TELEPHONE ENCOUNTER
----- Message from Lizzie Huff MD sent at 11/7/2022  6:26 AM CST -----  I sent pt a my chart Please inform pt -   I reviewed your  labs.  Your Ha1c was normal at 5.5.Your TSH remains slightly elevated on your current dose of  Brand Synthroid 137 mcg 21 tab every day (M-Sat) and take an extra 1/2 tab (1.5 tab) on Sunday.  I rec we increase your regimen to 150 mcg daily and repeat your thyroid functions in 6 wks.   Your Cholesterol looked very high despite being on Cretor 40 mg daily.  Do you miss doses and if so how often.  We  could consider adding another cholesterol medication called Zetia to your regimen.  Let me know your thoughts and I can send to the pharmacy.   You would need to alert me if you notice any muscle aches or weakness. Sometimes when we see a cholesterol as high as yours we like to screen for a genetic disorder called familial hypercholesterolemia.   We can further discuss this at your next visit.  No further recommendations at this time.    Dr. BOTELLO

## 2022-11-08 LAB — LPA SERPL-MCNC: 9 MG/DL (ref 0–30)

## 2022-11-09 ENCOUNTER — PATIENT MESSAGE (OUTPATIENT)
Dept: PRIMARY CARE CLINIC | Facility: CLINIC | Age: 62
End: 2022-11-09
Payer: COMMERCIAL

## 2022-11-09 ENCOUNTER — TELEPHONE (OUTPATIENT)
Dept: HEPATOLOGY | Facility: CLINIC | Age: 62
End: 2022-11-09
Payer: COMMERCIAL

## 2022-11-09 NOTE — TELEPHONE ENCOUNTER
Have her make the soonest available appt with hepatology.  Her MRI will be done as soon as they can get her scheduled which should be soon and we can see what that shows and if we can get her in any sooner    Dr. BOTELLO

## 2022-12-02 ENCOUNTER — HOSPITAL ENCOUNTER (OUTPATIENT)
Dept: RADIOLOGY | Facility: HOSPITAL | Age: 62
Discharge: HOME | End: 2022-12-02
Attending: INTERNAL MEDICINE
Payer: COMMERCIAL

## 2022-12-02 DIAGNOSIS — R16.0 LIVER MASS: ICD-10-CM

## 2022-12-02 DIAGNOSIS — K76.9 LIVER DISEASE, UNSPECIFIED: ICD-10-CM

## 2022-12-02 PROCEDURE — 74183 MRI ABD W/O CNTR FLWD CNTR: CPT | Mod: 26,,, | Performed by: RADIOLOGY

## 2022-12-02 PROCEDURE — 74183 MRI ABD W/O CNTR FLWD CNTR: CPT | Mod: TC

## 2022-12-02 PROCEDURE — A9585 GADOBUTROL INJECTION: HCPCS | Performed by: INTERNAL MEDICINE

## 2022-12-02 PROCEDURE — 74183 MRI ABDOMEN W WO CONTRAST: ICD-10-PCS | Mod: 26,,, | Performed by: RADIOLOGY

## 2022-12-02 PROCEDURE — 25500020 PHARM REV CODE 255: Performed by: INTERNAL MEDICINE

## 2022-12-02 RX ORDER — GADOBUTROL 604.72 MG/ML
10 INJECTION INTRAVENOUS
Status: COMPLETED | OUTPATIENT
Start: 2022-12-02 | End: 2022-12-02

## 2022-12-02 RX ADMIN — GADOBUTROL 10 ML: 604.72 INJECTION INTRAVENOUS at 06:12

## 2022-12-05 NOTE — PROGRESS NOTES
I would like to d/w pt.  I reviewed MRI Liver.  + Fatty liver/hepatosteatosis, Multiple liver cysts largest of which is 2 cm. There is a small focus of arterial portal shunting enhancing in the inferior right hepatic lobe. Unsure of the cause of this finding possible due to one of the cysts compressing things? Multiple parapelvic renal cysts, primarily in left kidney.I ave already referred her to Hepatology to further investigate and await their evaluation.   Dr. BOTELLO

## 2022-12-08 ENCOUNTER — TELEPHONE (OUTPATIENT)
Dept: PRIMARY CARE CLINIC | Facility: CLINIC | Age: 62
End: 2022-12-08
Payer: COMMERCIAL

## 2023-01-11 ENCOUNTER — OFFICE VISIT (OUTPATIENT)
Dept: HEPATOLOGY | Facility: CLINIC | Age: 63
End: 2023-01-11
Payer: MEDICARE

## 2023-01-11 ENCOUNTER — LAB VISIT (OUTPATIENT)
Dept: LAB | Facility: HOSPITAL | Age: 63
End: 2023-01-11
Attending: INTERNAL MEDICINE
Payer: MEDICARE

## 2023-01-11 VITALS
HEIGHT: 62 IN | RESPIRATION RATE: 18 BRPM | SYSTOLIC BLOOD PRESSURE: 129 MMHG | OXYGEN SATURATION: 97 % | TEMPERATURE: 97 F | DIASTOLIC BLOOD PRESSURE: 74 MMHG | WEIGHT: 161.81 LBS | BODY MASS INDEX: 29.78 KG/M2 | HEART RATE: 73 BPM

## 2023-01-11 DIAGNOSIS — K76.0 FATTY LIVER: Primary | ICD-10-CM

## 2023-01-11 DIAGNOSIS — E03.9 HYPOTHYROIDISM, UNSPECIFIED TYPE: ICD-10-CM

## 2023-01-11 DIAGNOSIS — L29.9 PRURITUS: ICD-10-CM

## 2023-01-11 DIAGNOSIS — R97.8 OTHER ABNORMAL TUMOR MARKERS: ICD-10-CM

## 2023-01-11 DIAGNOSIS — R16.0 LIVER MASS: ICD-10-CM

## 2023-01-11 LAB
AFP SERPL-MCNC: 7.6 NG/ML (ref 0–8.4)
ALBUMIN SERPL BCP-MCNC: 4.2 G/DL (ref 3.5–5.2)
ALP SERPL-CCNC: 77 U/L (ref 55–135)
ALT SERPL W/O P-5'-P-CCNC: 25 U/L (ref 10–44)
ANION GAP SERPL CALC-SCNC: 10 MMOL/L (ref 8–16)
AST SERPL-CCNC: 21 U/L (ref 10–40)
BASOPHILS # BLD AUTO: 0.04 K/UL (ref 0–0.2)
BASOPHILS NFR BLD: 0.6 % (ref 0–1.9)
BILIRUB SERPL-MCNC: 0.7 MG/DL (ref 0.1–1)
BUN SERPL-MCNC: 14 MG/DL (ref 8–23)
CALCIUM SERPL-MCNC: 9.5 MG/DL (ref 8.7–10.5)
CANCER AG125 SERPL-ACNC: 10 U/ML (ref 0–30)
CANCER AG19-9 SERPL-ACNC: <2.1 U/ML (ref 0–40)
CEA SERPL-MCNC: <1.7 NG/ML (ref 0–5)
CHLORIDE SERPL-SCNC: 105 MMOL/L (ref 95–110)
CO2 SERPL-SCNC: 26 MMOL/L (ref 23–29)
CREAT SERPL-MCNC: 0.8 MG/DL (ref 0.5–1.4)
CREAT SERPL-MCNC: 0.8 MG/DL (ref 0.5–1.4)
DIFFERENTIAL METHOD: NORMAL
EOSINOPHIL # BLD AUTO: 0.2 K/UL (ref 0–0.5)
EOSINOPHIL NFR BLD: 2.5 % (ref 0–8)
ERYTHROCYTE [DISTWIDTH] IN BLOOD BY AUTOMATED COUNT: 13.2 % (ref 11.5–14.5)
EST. GFR  (NO RACE VARIABLE): >60 ML/MIN/1.73 M^2
EST. GFR  (NO RACE VARIABLE): >60 ML/MIN/1.73 M^2
GLUCOSE SERPL-MCNC: 111 MG/DL (ref 70–110)
HAV IGG SER QL IA: NORMAL
HBV CORE AB SERPL QL IA: NORMAL
HBV SURFACE AB SER-ACNC: <3 MIU/ML
HBV SURFACE AB SER-ACNC: NORMAL M[IU]/ML
HBV SURFACE AG SERPL QL IA: NORMAL
HCT VFR BLD AUTO: 43.9 % (ref 37–48.5)
HGB BLD-MCNC: 14.1 G/DL (ref 12–16)
IGG SERPL-MCNC: 746 MG/DL (ref 650–1600)
IMM GRANULOCYTES # BLD AUTO: 0.01 K/UL (ref 0–0.04)
IMM GRANULOCYTES NFR BLD AUTO: 0.1 % (ref 0–0.5)
INR PPP: 1 (ref 0.8–1.2)
LYMPHOCYTES # BLD AUTO: 2.7 K/UL (ref 1–4.8)
LYMPHOCYTES NFR BLD: 37.5 % (ref 18–48)
MCH RBC QN AUTO: 28.7 PG (ref 27–31)
MCHC RBC AUTO-ENTMCNC: 32.1 G/DL (ref 32–36)
MCV RBC AUTO: 89 FL (ref 82–98)
MONOCYTES # BLD AUTO: 0.6 K/UL (ref 0.3–1)
MONOCYTES NFR BLD: 8.7 % (ref 4–15)
NEUTROPHILS # BLD AUTO: 3.6 K/UL (ref 1.8–7.7)
NEUTROPHILS NFR BLD: 50.6 % (ref 38–73)
NRBC BLD-RTO: 0 /100 WBC
PLATELET # BLD AUTO: 262 K/UL (ref 150–450)
PMV BLD AUTO: 10.6 FL (ref 9.2–12.9)
POTASSIUM SERPL-SCNC: 3.7 MMOL/L (ref 3.5–5.1)
PROT SERPL-MCNC: 7.2 G/DL (ref 6–8.4)
PROTHROMBIN TIME: 10.7 SEC (ref 9–12.5)
RBC # BLD AUTO: 4.91 M/UL (ref 4–5.4)
SODIUM SERPL-SCNC: 141 MMOL/L (ref 136–145)
T4 FREE SERPL-MCNC: 0.99 NG/DL (ref 0.71–1.51)
TSH SERPL DL<=0.005 MIU/L-ACNC: 0.02 UIU/ML (ref 0.4–4)
WBC # BLD AUTO: 7.15 K/UL (ref 3.9–12.7)

## 2023-01-11 PROCEDURE — 99204 OFFICE O/P NEW MOD 45 MIN: CPT | Mod: S$PBB,,, | Performed by: INTERNAL MEDICINE

## 2023-01-11 PROCEDURE — 85610 PROTHROMBIN TIME: CPT | Performed by: INTERNAL MEDICINE

## 2023-01-11 PROCEDURE — 82105 ALPHA-FETOPROTEIN SERUM: CPT | Performed by: INTERNAL MEDICINE

## 2023-01-11 PROCEDURE — 85025 COMPLETE CBC W/AUTO DIFF WBC: CPT | Performed by: INTERNAL MEDICINE

## 2023-01-11 PROCEDURE — 99204 PR OFFICE/OUTPT VISIT, NEW, LEVL IV, 45-59 MIN: ICD-10-PCS | Mod: S$PBB,,, | Performed by: INTERNAL MEDICINE

## 2023-01-11 PROCEDURE — 86301 IMMUNOASSAY TUMOR CA 19-9: CPT | Performed by: INTERNAL MEDICINE

## 2023-01-11 PROCEDURE — 86790 VIRUS ANTIBODY NOS: CPT | Performed by: INTERNAL MEDICINE

## 2023-01-11 PROCEDURE — 99999 PR PBB SHADOW E&M-EST. PATIENT-LVL V: ICD-10-PCS | Mod: PBBFAC,,, | Performed by: INTERNAL MEDICINE

## 2023-01-11 PROCEDURE — 84443 ASSAY THYROID STIM HORMONE: CPT | Performed by: INTERNAL MEDICINE

## 2023-01-11 PROCEDURE — 86381 MITOCHONDRIAL ANTIBODY EACH: CPT | Performed by: INTERNAL MEDICINE

## 2023-01-11 PROCEDURE — 36415 COLL VENOUS BLD VENIPUNCTURE: CPT | Mod: PN | Performed by: INTERNAL MEDICINE

## 2023-01-11 PROCEDURE — 80053 COMPREHEN METABOLIC PANEL: CPT | Performed by: INTERNAL MEDICINE

## 2023-01-11 PROCEDURE — 86706 HEP B SURFACE ANTIBODY: CPT | Mod: 91 | Performed by: INTERNAL MEDICINE

## 2023-01-11 PROCEDURE — 82784 ASSAY IGA/IGD/IGG/IGM EACH: CPT | Performed by: INTERNAL MEDICINE

## 2023-01-11 PROCEDURE — 86039 ANTINUCLEAR ANTIBODIES (ANA): CPT | Performed by: INTERNAL MEDICINE

## 2023-01-11 PROCEDURE — 87340 HEPATITIS B SURFACE AG IA: CPT | Performed by: INTERNAL MEDICINE

## 2023-01-11 PROCEDURE — 80321 ALCOHOLS BIOMARKERS 1OR 2: CPT | Performed by: INTERNAL MEDICINE

## 2023-01-11 PROCEDURE — 84439 ASSAY OF FREE THYROXINE: CPT | Performed by: INTERNAL MEDICINE

## 2023-01-11 PROCEDURE — 99215 OFFICE O/P EST HI 40 MIN: CPT | Mod: PBBFAC,PN | Performed by: INTERNAL MEDICINE

## 2023-01-11 PROCEDURE — 86235 NUCLEAR ANTIGEN ANTIBODY: CPT | Performed by: INTERNAL MEDICINE

## 2023-01-11 PROCEDURE — 86038 ANTINUCLEAR ANTIBODIES: CPT | Performed by: INTERNAL MEDICINE

## 2023-01-11 PROCEDURE — 86704 HEP B CORE ANTIBODY TOTAL: CPT | Performed by: INTERNAL MEDICINE

## 2023-01-11 PROCEDURE — 82378 CARCINOEMBRYONIC ANTIGEN: CPT | Performed by: INTERNAL MEDICINE

## 2023-01-11 PROCEDURE — 86015 ACTIN ANTIBODY EACH: CPT | Performed by: INTERNAL MEDICINE

## 2023-01-11 PROCEDURE — 86304 IMMUNOASSAY TUMOR CA 125: CPT | Performed by: INTERNAL MEDICINE

## 2023-01-11 PROCEDURE — 99999 PR PBB SHADOW E&M-EST. PATIENT-LVL V: CPT | Mod: PBBFAC,,, | Performed by: INTERNAL MEDICINE

## 2023-01-11 RX ORDER — ESTROGENS, CONJUGATED 0.45 MG/1
0.45 TABLET, FILM COATED ORAL
COMMUNITY
Start: 2022-12-28 | End: 2023-07-31

## 2023-01-11 NOTE — PROGRESS NOTES
HEPATOLOGY CONSULTATION    Referring Physician: Lizzie Huff MD   Current Corresponding Physician: Lizzie Huff MD     Reason for Consultation: Consultation for evaluation of Abnormal Abdominal/Liver Imaging    History of Present Illness: Mayelin Chowdary is a 63 y.o. female with a history of HLD, hypothyroidism, GERD and fatty liver who presents for evaluation of a liver lesion noted abdo US:    Abdo US 11/7/22: hepatic steatosis; Liver: Echogenic parenchyma and normal in size, measuring 16.7 cm.  Solid hypoechoic lesion in the right hepatic lobe, measures 1.2 x 0.8 x 0.9 cm.  Few hepatic cysts, the largest measures 2.3 cm.   MRI abdo w wo contrast 12/2/22: Liver: There is loss of signal on out of phase imaging throughout the right hepatic lobe suggestive of steatosis.  Multiple hepatic cysts, the largest measures 2.0 cm in hepatic segment 4 B. there is a small focus of arterial portal shunting enhancing in the inferior right hepatic lobe (axial series 11, image 66). No masses identified.    Mammogram 1/31/22: normal  Colonsocopy 6/1/22: normal; repeat in 10 years recommended      Labs 1/6/22: ALT 29, AST 19, ALKP 70, Tbil 0.4, plts 276  HCV Ab-  Alcohol: social  BMI: 29.6    Recalls having CT many years ago and being told she has a hemangioma. Feeling well with no N/V or diarrhea. Has longstanding epigastric pain. Did not improve with cholecystectomy or antacids. Does have pruritus, intermittent for years. Saw a dermatologist- no etiology determined. Pruritus relieved with xanax.    The patient denies any symptoms of decompensated cirrhosis, including no ascites or edema, cognitive problems that would suggest hepatic encephalopathy, or GI bleeding from varices.       Chief Complaint   Patient presents with    Abnormal Abdominal/Liver Imaging       Past Medical History:   Diagnosis Date    Anxiety     Gallstones     Hepatic cyst 1/6/2021    High cholesterol     Hypothyroid     Migraines     Renal cyst,  left 1/6/2021    Stress incontinence (female) (male) 12/13/2012    Note: Unchanged     Outpatient Encounter Medications as of 1/11/2023   Medication Sig Dispense Refill    amitriptyline (ELAVIL) 25 MG tablet Take 1 tablet (25 mg total) by mouth nightly as needed for Insomnia. 30 tablet 5    ipratropium (ATROVENT) 42 mcg (0.06 %) nasal spray 2 sprays by Nasal route 2 (two) times daily. 15 mL 3    PREMARIN 0.45 mg tablet Take 0.45 mg by mouth.      rosuvastatin (CRESTOR) 40 MG Tab Take 1 tablet (40 mg total) by mouth once daily. 90 tablet 1    sumatriptan (IMITREX) 50 MG tablet 1 po at onset of migraine and can repeat in 2 hrs - max of 2 in 24 hrs 12 tablet 0    SYNTHROID 150 mcg tablet Take 1 tablet (150 mcg total) by mouth before breakfast. 90 tablet 0    ALPRAZolam (XANAX) 0.25 MG tablet 1 po daily prn anxiety (Patient not taking: Reported on 1/11/2023) 30 tablet 0    chlordiazepoxide-clidinium 5-2.5 mg (LIBRAX) 5-2.5 mg Cap Take 1 capsule by mouth 2 (two) times daily as needed.      diazePAM (VALIUM) 5 MG tablet 1 po 30 min prior to MRI (Patient not taking: Reported on 1/11/2023) 1 tablet 0    hydrOXYzine HCL (ATARAX) 25 MG tablet Take 1 tablet (25 mg total) by mouth 3 (three) times daily as needed for Itching. (Patient not taking: Reported on 1/11/2023) 90 tablet 0    promethazine (PHENERGAN) 25 MG tablet Take 1 tablet (25 mg total) by mouth every 8 (eight) hours as needed. (Patient not taking: Reported on 1/11/2023) 30 tablet 0     No facility-administered encounter medications on file as of 1/11/2023.     Review of patient's allergies indicates:   Allergen Reactions    Bactrim [sulfamethoxazole-trimethoprim] Nausea Only     Family History   Problem Relation Age of Onset    Diabetes Father     Lung cancer Father     Pacemaker/defibrilator Mother     Alzheimer's disease Mother     Breast cancer Maternal Aunt     Breast cancer Maternal Aunt        Social History     Socioeconomic History    Marital status:     Tobacco Use    Smoking status: Never    Smokeless tobacco: Never   Substance and Sexual Activity    Alcohol use: Not Currently     Comment: social    Drug use: Never    Sexual activity: Not Currently     Review of Systems   Constitutional: Negative.    HENT: Negative.     Eyes: Negative.    Respiratory: Negative.     Cardiovascular: Negative.    Gastrointestinal:  Positive for abdominal pain (epigastric).   Genitourinary: Negative.    Musculoskeletal: Negative.    Skin: Negative.         +pruritus     Neurological: Negative.    Psychiatric/Behavioral: Negative.     Vitals:    01/11/23 0755   BP: 129/74   Pulse: 73   Resp: 18   Temp: 97.2 °F (36.2 °C)       Physical Exam  Vitals reviewed.   Constitutional:       Appearance: She is well-developed.   HENT:      Head: Normocephalic and atraumatic.   Eyes:      General: No scleral icterus.     Conjunctiva/sclera: Conjunctivae normal.      Pupils: Pupils are equal, round, and reactive to light.   Neck:      Thyroid: No thyromegaly.   Cardiovascular:      Rate and Rhythm: Normal rate and regular rhythm.      Heart sounds: Normal heart sounds.   Pulmonary:      Effort: Pulmonary effort is normal.      Breath sounds: Normal breath sounds. No rales.   Abdominal:      General: Bowel sounds are normal. There is no distension.      Palpations: Abdomen is soft. There is no mass.      Tenderness: There is no abdominal tenderness.   Musculoskeletal:         General: Normal range of motion.      Cervical back: Normal range of motion and neck supple.   Skin:     General: Skin is warm and dry.      Findings: No rash.   Neurological:      Mental Status: She is alert and oriented to person, place, and time.       Lab Results   Component Value Date     01/06/2022    BUN 13 01/06/2022    CREATININE 0.7 01/06/2022    CALCIUM 9.5 01/06/2022     01/06/2022    K 4.4 01/06/2022     01/06/2022    PROT 7.1 01/06/2022    CO2 23 01/06/2022    ANIONGAP 12 01/06/2022    WBC 8.17  01/06/2022    RBC 4.94 01/06/2022    HGB 14.6 01/06/2022    HCT 43.7 01/06/2022    MCV 89 01/06/2022    MCH 29.6 01/06/2022    MCHC 33.4 01/06/2022     Lab Results   Component Value Date    RDW 13.2 01/06/2022     01/06/2022    MPV 10.0 01/06/2022    GRAN 3.6 01/06/2022    GRAN 43.7 01/06/2022    LYMPH 3.7 01/06/2022    LYMPH 45.8 01/06/2022    MONO 0.5 01/06/2022    MONO 6.6 01/06/2022    EOSINOPHIL 3.2 01/06/2022    BASOPHIL 0.6 01/06/2022    EOS 0.3 01/06/2022    BASO 0.05 01/06/2022    CHOL 283 (H) 11/03/2022    TRIG 206 (H) 11/03/2022    HDL 44 11/03/2022    CHOLHDL 15.5 (L) 11/03/2022    TOTALCHOLEST 6.4 (H) 11/03/2022    ALBUMIN 4.0 01/06/2022    AST 19 01/06/2022    ALT 29 01/06/2022    ALKPHOS 70 01/06/2022       Assessment and Plan:  Mayelin Chowdary is a 63 y.o. female with fatty liver, hepatic cysts. Previous liver enzymes normal. Abdo US suggests a small liver lesion not seen on MRI. Possible hx liver lesion noted on CT scan and told she had a hemangioma. I am recommending a repeat CT scan now to f/u. I doubt malignancy since the lesion is not seen on MRI. Will check tumor markers. Also recommend fibroscan to stage fatty liver. Hepatic cysts require no further evaluation or follow up. Pruritus etiology unclear. I doubt liver disease since liver enzymes are normal. Will recheck liver enzymes, autoimmune markers and bile acids.    Return 4-6 weeks. Video visit ok.

## 2023-01-12 ENCOUNTER — PATIENT MESSAGE (OUTPATIENT)
Dept: TRANSPLANT | Facility: CLINIC | Age: 63
End: 2023-01-12
Payer: MEDICARE

## 2023-01-12 ENCOUNTER — HOSPITAL ENCOUNTER (OUTPATIENT)
Dept: RADIOLOGY | Facility: HOSPITAL | Age: 63
Discharge: HOME | End: 2023-01-12
Attending: INTERNAL MEDICINE
Payer: MEDICARE

## 2023-01-12 ENCOUNTER — PROCEDURE VISIT (OUTPATIENT)
Dept: HEPATOLOGY | Facility: CLINIC | Age: 63
End: 2023-01-12
Payer: MEDICARE

## 2023-01-12 DIAGNOSIS — K76.0 FATTY LIVER: ICD-10-CM

## 2023-01-12 DIAGNOSIS — R16.0 LIVER MASS: ICD-10-CM

## 2023-01-12 LAB
ANA PATTERN 1: NORMAL
ANA PATTERN 2: NORMAL
ANA SER QL IF: POSITIVE
ANA TITER 2: NORMAL
ANA TITR SER IF: NORMAL {TITER}
BILE AC SERPL-SCNC: 4 MCMOL/L
MITOCHONDRIA AB TITR SER IF: NORMAL {TITER}

## 2023-01-12 PROCEDURE — 74170 CT ABDOMEN W WO CONTRAST: ICD-10-PCS | Mod: 26,,, | Performed by: RADIOLOGY

## 2023-01-12 PROCEDURE — 74170 CT ABD WO CNTRST FLWD CNTRST: CPT | Mod: TC

## 2023-01-12 PROCEDURE — 91200 LIVER ELASTOGRAPHY: CPT | Mod: PBBFAC | Performed by: INTERNAL MEDICINE

## 2023-01-12 PROCEDURE — 25500020 PHARM REV CODE 255: Performed by: INTERNAL MEDICINE

## 2023-01-12 PROCEDURE — 74170 CT ABD WO CNTRST FLWD CNTRST: CPT | Mod: 26,,, | Performed by: RADIOLOGY

## 2023-01-12 PROCEDURE — 91200 FIBROSCAN NEW ORLEANS: ICD-10-PCS | Mod: 26,S$PBB,, | Performed by: INTERNAL MEDICINE

## 2023-01-12 RX ADMIN — IOHEXOL 75 ML: 350 INJECTION, SOLUTION INTRAVENOUS at 06:01

## 2023-01-12 NOTE — PROCEDURES
FibroScan [place]    Date/Time: 1/12/2023 11:00 AM  Performed by: Rebecca Raygoza MD  Authorized by: Rebecca Raygoza MD     Diagnosis:  NAFLD    Probe:  M    Universal Protocol: Patient's identity, procedure and site were verified, confirmatory pause was performed.  Discussed procedure including risks and potential complications.  Questions answered.  Patient verbalizes understanding and wishes to proceed with VCTE.     Procedure: After providing explanations of the procedure, patient was placed in the supine position with right arm in maximum abduction to allow optimal exposure of right lateral abdomen.  Patient was briefly assessed, Testing was performed in the mid-axillary location, 50Hz Shear Wave pulses were applied and the resulting Shear Wave and Propagation Speed detected with a 3.5 MHz ultrasonic signal, using the FibroScan probe, Skin to liver capsule distance and liver parenchyma were accessed during the entire examination with the FibroScan probe, Patient was instructed to breathe normally and to abstain from sudden movements during the procedure, allowing for random measurements of liver stiffness. At least 10 Shear Waves were produced, Individual measurements of each Shear Wave were calculated.  Patient tolerated the procedure well with no complications.  Meets discharge criteria as was dismissed.  Rates pain 0 out of 10.  Patient will follow up with ordering provider to review results.    Findings  Median liver stiffness score:  3.9  CAP Reading: dB/m:  317    IQR/med %:  10  Interpretation  Fibrosis interpretation is based on medial liver stiffness - Kilopascal (kPa).    Fibrosis Stage:  F 0-1  Steatosis interpretation is based on controlled attenuation parameter - (dB/m).    Steatosis Grade:  S3  Comments/Plan:  >67% steatosis

## 2023-01-13 ENCOUNTER — PATIENT MESSAGE (OUTPATIENT)
Dept: PRIMARY CARE CLINIC | Facility: CLINIC | Age: 63
End: 2023-01-13
Payer: MEDICARE

## 2023-01-13 LAB
ANTI SM ANTIBODY: 0.06 RATIO (ref 0–0.99)
ANTI SM/RNP ANTIBODY: 0.09 RATIO (ref 0–0.99)
ANTI-SM INTERPRETATION: NEGATIVE
ANTI-SM/RNP INTERPRETATION: NEGATIVE
ANTI-SSA ANTIBODY: 0.07 RATIO (ref 0–0.99)
ANTI-SSA INTERPRETATION: NEGATIVE
ANTI-SSB ANTIBODY: 0.04 RATIO (ref 0–0.99)
ANTI-SSB INTERPRETATION: NEGATIVE
DSDNA AB SER-ACNC: NORMAL [IU]/ML

## 2023-01-13 NOTE — TELEPHONE ENCOUNTER
"Lov 10/31/22  Last note states"Pruritis - resolved. Rx Hydroxyzine to take every 8 hrs as needed for itching and also rec she take Famotidine  (Pepcid OTC) 20 mg once-twice daily - this is an acid reduced but helps with hives as it is a different type of antihistamine.  If she does not find the hydroxyzine helpful or it makes her too tired she can try Claritin 10 mg twice per day as needed for itching instead. She fu with Derm, Dr. [facility], and A/I, Dr. Santana. Allergy testing was neg. Per pt. She reports itching after taking Tylenol so she avoids it. "  "

## 2023-01-13 NOTE — TELEPHONE ENCOUNTER
Unfortunately, I'm at a loss at this point.  Does not appear to be due to her liver disease. She has been seeing Derm and A/I.  I suggest she reach out to them again to see what else they can offer     Dr. BOTELLO

## 2023-01-17 LAB
CLINICAL BIOCHEMIST REVIEW: NORMAL
PLPETH BLD-MCNC: 26 NG/ML
POPETH BLD-MCNC: 25 NG/ML

## 2023-01-18 LAB — SMOOTH MUSCLE AB TITR SER IF: ABNORMAL {TITER}

## 2023-01-24 ENCOUNTER — PATIENT MESSAGE (OUTPATIENT)
Dept: PRIMARY CARE CLINIC | Facility: CLINIC | Age: 63
End: 2023-01-24
Payer: MEDICARE

## 2023-01-25 ENCOUNTER — PATIENT MESSAGE (OUTPATIENT)
Dept: PRIMARY CARE CLINIC | Facility: CLINIC | Age: 63
End: 2023-01-25
Payer: MEDICARE

## 2023-01-25 DIAGNOSIS — E03.9 HYPOTHYROIDISM, UNSPECIFIED TYPE: Primary | ICD-10-CM

## 2023-01-25 NOTE — TELEPHONE ENCOUNTER
Lov 10/31/22  Pt seen derm for the itching as you recommended. Derm rec pt see endo believing the itching has something to do with her thyroid. Pt would like external referral  to . Please sign pended referral as they are requiring this prior to scheduling an appt for her.

## 2023-01-26 DIAGNOSIS — E03.9 HYPOTHYROIDISM, UNSPECIFIED TYPE: Primary | ICD-10-CM

## 2023-01-26 RX ORDER — LEVOTHYROXINE SODIUM 137 UG/1
137 TABLET ORAL
Qty: 90 TABLET | Refills: 0 | Status: SHIPPED | OUTPATIENT
Start: 2023-01-26 | End: 2023-12-04 | Stop reason: SDUPTHER

## 2023-01-27 NOTE — PROGRESS NOTES
Please inform pt -   Your TSH was low at 0.22 and Free T4 was normal on Levothyroxine 150 mcg daily. Let's cut the Levothyroxine to 137 mcg daily and repeat the Thyroid functions in 6 wks.  Please make sure she is taking this medication by itself at least 30 minutes prior to any other meds or food. Her KEYUR was positive and Lupus panel was negative.  I saw her AntiSmooth muscle Antibody that Hepatology ordered was mildly positive and defer to them on that matter as this is usually a test for autoimmune hepatitis but her level was barely elevated so not sure this is clinically significant.   Dr. BOTELLO

## 2023-01-30 ENCOUNTER — OFFICE VISIT (OUTPATIENT)
Dept: TRANSPLANT | Facility: CLINIC | Age: 63
End: 2023-01-30
Payer: MEDICARE

## 2023-01-30 DIAGNOSIS — K76.0 FATTY LIVER: Primary | ICD-10-CM

## 2023-01-30 DIAGNOSIS — K76.89 HEPATIC CYST: ICD-10-CM

## 2023-01-30 DIAGNOSIS — R10.10 PAIN OF UPPER ABDOMEN: ICD-10-CM

## 2023-01-30 PROCEDURE — 99214 PR OFFICE/OUTPT VISIT, EST, LEVL IV, 30-39 MIN: ICD-10-PCS | Mod: 95,,, | Performed by: INTERNAL MEDICINE

## 2023-01-30 PROCEDURE — 99214 OFFICE O/P EST MOD 30 MIN: CPT | Mod: 95,,, | Performed by: INTERNAL MEDICINE

## 2023-01-30 NOTE — PROGRESS NOTES
HEPATOLOGY FOLLOW UP    Referring Physician: Lizzie Huff MD   Current Corresponding Physician: Lizzie Huff MD     Mayelin Chowdary is here for follow up of a liver lesion    HPI  Mayelin Chowdary is a 63 y.o. female with a history of HLD, hypothyroidism, GERD and fatty liver who presents for evaluation of a liver lesion noted abdo US. I saw her in consultation 1/11/23:     Abdo US 11/7/22: hepatic steatosis; Liver: Echogenic parenchyma and normal in size, measuring 16.7 cm.  Solid hypoechoic lesion in the right hepatic lobe, measures 1.2 x 0.8 x 0.9 cm.  Few hepatic cysts, the largest measures 2.3 cm.   MRI abdo w wo contrast 12/2/22: Liver: There is loss of signal on out of phase imaging throughout the right hepatic lobe suggestive of steatosis.  Multiple hepatic cysts, the largest measures 2.0 cm in hepatic segment 4 B. there is a small focus of arterial portal shunting enhancing in the inferior right hepatic lobe (axial series 11, image 66). No masses identified.     Mammogram 1/31/22: normal  Colonsocopy 6/1/22: normal; repeat in 10 years recommended        Labs 1/6/22: ALT 29, AST 19, ALKP 70, Tbil 0.4, plts 276  HCV Ab-  Alcohol: social  BMI: 29.6     Recalls having CT many years ago and being told she has a hemangioma. Feeling well with no N/V or diarrhea. Has longstanding epigastric pain. Did not improve with cholecystectomy or antacids. Does have pruritus, intermittent for years. Saw a dermatologist- no etiology determined. Pruritus relieved with xanax.     The patient denies any symptoms of decompensated cirrhosis, including no ascites or edema, cognitive problems that would suggest hepatic encephalopathy, or GI bleeding from varices.     Interval History  Impression at last visit: fatty liver, hepatic cysts: Previous liver enzymes normal. Abdo US suggests a small liver lesion not seen on MRI. Possible hx liver lesion noted on CT scan and told she had a hemangioma. I am recommending a repeat CT  scan now to f/u. I doubt malignancy since the lesion is not seen on MRI. Will check tumor markers. Also recommend fibroscan to stage fatty liver. Hepatic cysts require no further evaluation or follow up. Pruritus etiology unclear. I doubt liver disease since liver enzymes are normal. Will recheck liver enzymes, autoimmune markers and bile acids.    Since Mayelin Chowdary's last visit:    --pruritus stopped- pt feels is related to synthroid; now on levothyroxine; bile acids normal and liver tests remain normal  --mild abdominal pain    Labs 1/11/23: ALT 25, AST 21, ALKP 77, Tbil 0.7, bile acids 4, peth 25  CEA <1.7, AFP 7.6,  10, CA 19-9 <2.1    Fibrsocan: S3 steatosis; F0-1 fibrosis    CT abdo w wo contrast 1/12/23: Liver: Diffusely hypoattenuating liver parenchyma, suggestive of hepatic steatosis.  Redemonstration of 1.2 cm early arterial enhancing focus in the inferior right hepatic lobe (axial series 2, image 70) no evidence of washout or pseudo capsule.  This finding is similar to recent MRI and could represent small focus of arterial portal shunting.  Additional scattered hepatic cysts, similar to prior exam the largest measuring 2.1 cm in hepatic segment 4B; GI Tract/Mesentery: No evidence of bowel obstruction or inflammation.  Inflammatory stranding in the mid abdomen with multiple prominent mesenteric lymph nodes measuring up to 1.1 cm in short axis (axial series 2, image 66).  Findings likely represent mesenteric adenitis.    Outpatient Encounter Medications as of 1/30/2023   Medication Sig Dispense Refill    ALPRAZolam (XANAX) 0.25 MG tablet 1 po daily prn anxiety (Patient not taking: Reported on 1/11/2023) 30 tablet 0    amitriptyline (ELAVIL) 25 MG tablet Take 1 tablet (25 mg total) by mouth nightly as needed for Insomnia. 30 tablet 5    chlordiazepoxide-clidinium 5-2.5 mg (LIBRAX) 5-2.5 mg Cap Take 1 capsule by mouth 2 (two) times daily as needed.      diazePAM (VALIUM) 5 MG tablet 1 po 30 min  prior to MRI (Patient not taking: Reported on 1/11/2023) 1 tablet 0    hydrOXYzine HCL (ATARAX) 25 MG tablet Take 1 tablet (25 mg total) by mouth 3 (three) times daily as needed for Itching. (Patient not taking: Reported on 1/11/2023) 90 tablet 0    ipratropium (ATROVENT) 42 mcg (0.06 %) nasal spray 2 sprays by Nasal route 2 (two) times daily. 15 mL 3    levothyroxine (SYNTHROID) 137 MCG Tab tablet Take 1 tablet (137 mcg total) by mouth before breakfast. 90 tablet 0    PREMARIN 0.45 mg tablet Take 0.45 mg by mouth.      promethazine (PHENERGAN) 25 MG tablet Take 1 tablet (25 mg total) by mouth every 8 (eight) hours as needed. (Patient not taking: Reported on 1/11/2023) 30 tablet 0    rosuvastatin (CRESTOR) 40 MG Tab Take 1 tablet (40 mg total) by mouth once daily. 90 tablet 1    sumatriptan (IMITREX) 50 MG tablet 1 po at onset of migraine and can repeat in 2 hrs - max of 2 in 24 hrs 12 tablet 0    [DISCONTINUED] SYNTHROID 150 mcg tablet Take 1 tablet (150 mcg total) by mouth before breakfast. 90 tablet 0     No facility-administered encounter medications on file as of 1/30/2023.     Review of patient's allergies indicates:   Allergen Reactions    Bactrim [sulfamethoxazole-trimethoprim] Nausea Only     Past Medical History:   Diagnosis Date    Anxiety     Gallstones     Hepatic cyst 1/6/2021    High cholesterol     Hypothyroid     Migraines     Pruritus 1/11/2023    Renal cyst, left 1/6/2021    Stress incontinence (female) (male) 12/13/2012    Note: Unchanged       Review of Systems   Constitutional: Negative.    HENT: Negative.     Eyes: Negative.    Respiratory: Negative.     Cardiovascular: Negative.    Gastrointestinal: Negative.    Genitourinary: Negative.    Musculoskeletal: Negative.    Skin: Negative.    Neurological: Negative.    Psychiatric/Behavioral: Negative.           Physical Exam  Vitals reviewed.   Constitutional:       Appearance: She is well-developed.   HENT:      Head: Normocephalic and  atraumatic.   Eyes:      General: No scleral icterus.     Conjunctiva/sclera: Conjunctivae normal.      Pupils: Pupils are equal, round, and reactive to light.   Neck:      Thyroid: No thyromegaly.   Cardiovascular:      Rate and Rhythm: Normal rate and regular rhythm.      Heart sounds: Normal heart sounds.   Pulmonary:      Effort: Pulmonary effort is normal.      Breath sounds: Normal breath sounds. No rales.   Abdominal:      General: Bowel sounds are normal. There is no distension.      Palpations: Abdomen is soft. There is no mass.      Tenderness: There is no abdominal tenderness.   Musculoskeletal:         General: Normal range of motion.      Cervical back: Normal range of motion and neck supple.   Skin:     General: Skin is warm and dry.      Findings: No rash.   Neurological:      Mental Status: She is alert and oriented to person, place, and time.       MELD-Na score: 6 at 1/11/2023  8:46 AM  MELD score: 6 at 1/11/2023  8:46 AM  Calculated from:  Serum Creatinine: 0.8 mg/dL (Using min of 1 mg/dL) at 1/11/2023  8:46 AM  Serum Sodium: 141 mmol/L (Using max of 137 mmol/L) at 1/11/2023  8:46 AM  Total Bilirubin: 0.7 mg/dL (Using min of 1 mg/dL) at 1/11/2023  8:46 AM  INR(ratio): 1.0 at 1/11/2023  8:46 AM  Age: 63 years    Lab Results   Component Value Date     (H) 01/11/2023    BUN 14 01/11/2023    CREATININE 0.8 01/11/2023    CREATININE 0.8 01/11/2023    CALCIUM 9.5 01/11/2023     01/11/2023    K 3.7 01/11/2023     01/11/2023    PROT 7.2 01/11/2023    CO2 26 01/11/2023    ANIONGAP 10 01/11/2023    WBC 7.15 01/11/2023    RBC 4.91 01/11/2023    HGB 14.1 01/11/2023    HCT 43.9 01/11/2023    MCV 89 01/11/2023    MCH 28.7 01/11/2023    MCHC 32.1 01/11/2023     Lab Results   Component Value Date    RDW 13.2 01/11/2023     01/11/2023    MPV 10.6 01/11/2023    GRAN 3.6 01/11/2023    GRAN 50.6 01/11/2023    LYMPH 2.7 01/11/2023    LYMPH 37.5 01/11/2023    MONO 0.6 01/11/2023    MONO 8.7  01/11/2023    EOSINOPHIL 2.5 01/11/2023    BASOPHIL 0.6 01/11/2023    EOS 0.2 01/11/2023    BASO 0.04 01/11/2023    CHOL 283 (H) 11/03/2022    TRIG 206 (H) 11/03/2022    HDL 44 11/03/2022    CHOLHDL 15.5 (L) 11/03/2022    TOTALCHOLEST 6.4 (H) 11/03/2022    ALBUMIN 4.2 01/11/2023    AST 21 01/11/2023    ALT 25 01/11/2023    ALKPHOS 77 01/11/2023    LABPROT 10.7 01/11/2023    INR 1.0 01/11/2023       Assessment and Plan:  Mayelin Chowdary is a 63 y.o. female with a fatty liver but no significant fibrosis. Current recommendations:  Fatty liver: return 1 year  Liver lesion: likely AP shunting rather than a lesion; will review with radiology  Abdo pain: mesenteric adenitis-f/u with local GI

## 2023-01-30 NOTE — Clinical Note
Colton- can you look at her films and let me know if you think she has a lesion or is it a focus of AP shunting?

## 2023-01-30 NOTE — PATIENT INSTRUCTIONS
Fibroscan shows fatty liver but no scar;8- return 1 year to f/u  F/u with GI re mesenteric adenitis- get films and reports  I will await radiology review of the lesion

## 2023-02-02 ENCOUNTER — HOSPITAL ENCOUNTER (OUTPATIENT)
Dept: RADIOLOGY | Facility: HOSPITAL | Age: 63
Discharge: HOME | End: 2023-02-02
Attending: INTERNAL MEDICINE
Payer: MEDICARE

## 2023-02-02 DIAGNOSIS — Z12.31 SCREENING MAMMOGRAM, ENCOUNTER FOR: ICD-10-CM

## 2023-02-02 PROCEDURE — 77063 BREAST TOMOSYNTHESIS BI: CPT | Mod: 26,,, | Performed by: RADIOLOGY

## 2023-02-02 PROCEDURE — 77067 SCR MAMMO BI INCL CAD: CPT | Mod: 26,,, | Performed by: RADIOLOGY

## 2023-02-02 PROCEDURE — 77067 MAMMO DIGITAL SCREENING BILAT WITH TOMO: ICD-10-PCS | Mod: 26,,, | Performed by: RADIOLOGY

## 2023-02-02 PROCEDURE — 77067 SCR MAMMO BI INCL CAD: CPT | Mod: TC

## 2023-02-02 PROCEDURE — 77063 MAMMO DIGITAL SCREENING BILAT WITH TOMO: ICD-10-PCS | Mod: 26,,, | Performed by: RADIOLOGY

## 2023-02-04 PROBLEM — R10.9 ABDOMINAL PAIN: Status: ACTIVE | Noted: 2023-02-04

## 2023-02-05 ENCOUNTER — PATIENT MESSAGE (OUTPATIENT)
Dept: TRANSPLANT | Facility: CLINIC | Age: 63
End: 2023-02-05
Payer: MEDICARE

## 2023-02-05 DIAGNOSIS — K76.9 LIVER LESION: Primary | ICD-10-CM

## 2023-02-05 NOTE — PROGRESS NOTES
Reviewed films with radiology- unclear if liver lesion is a lesion vs AP shunting. Recommend MRI in 6 months

## 2023-02-07 NOTE — PROGRESS NOTES
I sent pt a my chart message -  I reviewed your Mammogram -   The breasts are heterogeneously dense, which may obscure small masses. There is no evidence of suspicious masses, microcalcifications or architectural distortion. No detrimental change.   Impression:   No mammographic evidence of malignancy.  Routine screening mammogram in 1 year is recommended.  Dr. BOTELLO

## 2023-04-04 ENCOUNTER — APPOINTMENT (OUTPATIENT)
Dept: RADIOLOGY | Facility: CLINIC | Age: 63
End: 2023-04-04
Attending: INTERNAL MEDICINE
Payer: MEDICARE

## 2023-04-04 DIAGNOSIS — Z78.0 POSTMENOPAUSAL: ICD-10-CM

## 2023-04-04 PROCEDURE — 77080 DXA BONE DENSITY AXIAL: CPT | Mod: TC,PO

## 2023-04-04 PROCEDURE — 77080 DEXA BONE DENSITY SPINE HIP: ICD-10-PCS | Mod: 26,,, | Performed by: INTERNAL MEDICINE

## 2023-04-04 PROCEDURE — 77080 DXA BONE DENSITY AXIAL: CPT | Mod: 26,,, | Performed by: INTERNAL MEDICINE

## 2023-04-06 ENCOUNTER — PATIENT MESSAGE (OUTPATIENT)
Dept: PRIMARY CARE CLINIC | Facility: CLINIC | Age: 63
End: 2023-04-06
Payer: MEDICARE

## 2023-04-13 ENCOUNTER — PATIENT MESSAGE (OUTPATIENT)
Dept: PRIMARY CARE CLINIC | Facility: CLINIC | Age: 63
End: 2023-04-13
Payer: MEDICARE

## 2023-04-17 NOTE — PROGRESS NOTES
I sent pt a my chart message -  I reviewed your DEXA/Bone Density which showed  was normal.  Dr. BOTELLO

## 2023-05-09 ENCOUNTER — PATIENT MESSAGE (OUTPATIENT)
Dept: RESEARCH | Facility: HOSPITAL | Age: 63
End: 2023-05-09
Payer: MEDICARE

## 2023-05-16 ENCOUNTER — PATIENT MESSAGE (OUTPATIENT)
Dept: RESEARCH | Facility: HOSPITAL | Age: 63
End: 2023-05-16
Payer: MEDICARE

## 2023-07-13 ENCOUNTER — PATIENT MESSAGE (OUTPATIENT)
Dept: PRIMARY CARE CLINIC | Facility: CLINIC | Age: 63
End: 2023-07-13
Payer: MEDICARE

## 2023-07-30 NOTE — PROGRESS NOTES
[facility] Note    Chief Complaint      Chief Complaint   Patient presents with    Follow-up       History of Present Illness      Mayelin Chowdary is a 63 y.o.  WF with Fatty liver, Hepatic cysts, Left parapelvic cyst, Obesity, and migraines presents to fu chronic issues. Referred by Kinza Lieberman. Last visit -10/31/22    +KEYUR - KEYUR was positive and Lupus panel was negative.  I saw her AntiSmooth muscle Antibody that Hepatology ordered was mildly positive and defer to them on that matter as this is usually a test for autoimmune hepatitis but her level was barely elevated so not sure this is clinically significant. She fu with Dr. Kelly.     Fatty Liver - Seen on MRI 10/16/18 at [facility]. Rec limit APAP/ETOH.  Rec diet and exercise for wt loss.  D/w pt potential for cirrhosis. CTA Abd- 1/12/23  - Hepatic steatosis.  Stable hepatic cysts and arterial enhancing lesion in the right hepatic lobe that could represent arterial portal shunting.  No suspicious lesions identified.Increased mesenteric stranding in the mid abdomen with multiple prominent mesenteric lymph nodes.  Findings likely represent mesenteric adenitis and are similar to prior MRI.   Mri abd - 12/2/22 -  + Fatty liver/hepatosteatosis, Multiple liver cysts largest of which is 2 cm. There is a small focus of arterial portal shunting enhancing in the inferior right hepatic lobe. Unsure of the cause of this finding possible due to one of the cysts compressing things? Multiple parapelvic renal cysts, primarily in left kidney.I referred her to Hepatology to further investigate.  Fibrosan - 1/12/23- 0-2; S - 3.  Fu by hepatology.    Pruritis - resolved. Rx Hydroxyzine to take every 8 hrs as needed for itching and also rec she take Famotidine  (Pepcid OTC) 20 mg once-twice daily - this is an acid reduced but helps with hives as it is a different type of antihistamine.  If she does not find the hydroxyzine helpful or it makes her too tired she can  "try Claritin 10 mg twice per day as needed for itching instead. She fu with Derm, Dr. [facility], and A/I, Dr. Santana. Allergy testing was neg. Per pt. She reports itching after taking Tylenol so she avoids it.      Hepatic Cysts - Seen on MRI 10/16/18 at [facility]. Fu by Hepatology.     Left parapelvic renal cyst - seen on MRI 10/16/18 at [facility].  Multiple parapelvic renal cysts, primarily in left kidney.     Overweight - BMI - 29.44-  Pt on Phentermine per Endo. She takes 1/2 tab daily.   She walks most days and plans to start a low carb diet.      HLD - Cholesterol was high so we inc Crestor to 40 mg daily. Continue a low cholesterol diet and exercise. Pt can visit the American heart association website at heart.org for further info on a low cholesterol diet.       Migraine - HA- Occurring daily the past 2 wks. She cut back on dairy and is on Amitripyline 10 mg/d.   Had Migraines in her 30's and it went away but started again several mos ago. Tylenol, Excedrine migraine, and ibuprofen no help. No photophobia with recent HA. No phonophobia. No aura.  Sometimes wakes up with the HA.  No worse with bending coughing.  No assoc N/V.  Fioricet no help. 7/10 in severity.  She thinks maxalt helped when she was younger. Pt has Imitrex prn. She has not been sleeping well and has been eating late at night.   Pt on amitriptyline 25 mcg/d - no change in sleep but HA controlled.  Doing well.      Anxiety -  She has always had flight anxiety. She has taken amitriptyline in past but was not for migraines. "I've got a lot of anxiety".  has been ill. Will start Venlafaxine ER. Warned pt about potential Side effects. Pt aware of FDA/BB warning. Pt aware of potential for withdrawal with abrupt discontinuation. Pt aware it will take 4-6 wks to feel full effects of this medication. She takes alprazolam prn sparingly.   is about to have surgery. She gets anxious sometimes. Cont current for now. Pt prefers prn medication.    "   Depression - Pt scored 4 on PHQ2 and 13 on PHQ 9 - c/w moderate depression. Will start Venlafaxine ER. Warned pt about potential Side effects. Pt aware of FDA/BB warning. Pt aware of potential for withdrawal with abrupt discontinuation. Pt aware it will take 4-6 wks to feel full effects of this medication.  Pt will alert MD for any concerns incl SI/HI.  RTC in 6 wks for fu.  Doing well.  had to have toe amputated due to melanoma.MARTIN resendez is going to [facility] and is planning for Tx for 9 wks.      Hypothyroidism  -TSH was low at 0.22 and Free T4 was normal on Levothyroxine 150 mcg daily so cut the Levothyroxine to 137 mcg daily and repeat the Thyroid functions in 6 wks.  Please make sure she is taking this medication by itself at least 30 minutes prior to any other meds or food.  She fu with Dr. Steffanie Clay. Due for fu and labs. Thyroid U/S - 1/31/22 -  thyroid Ultrasound. Heterogenous thyroid parenchyma without discrete nodule, similar to prior, and may reflect sequela of chronic/prior thyroiditis.     Carpal tunnel - Numbness tyrel hands - for the past couple mos.  Worse at night. She saw Dr. Kelly for this. She was told to sleep with wrist splints tyrel which helps but even driving or brushing her hair the hands go numb.  Steroid inj helped in past. Doing well s/p recent injections. Could consider EMG in future and referral back to Dr. Monzon in future if needed.      HCM - Flu - 10/31/22;  Tdap - 10/5/15;  PCV 20 - admin 7/31/23;  Shingrix - none - defers;  COVID -19 vaccine (Moderna) #1 -2/3/21 #2 - 3/3/21; # 3 - 11/29/21; # 4 7/26/22; # 5 declines; MGM - 2/2/23- repeat 1 yr;  DEXA - 4/4/23 - wnl; PAP - s/p hys in 20's and unilateral salpingoophorectomy; Hep C Screen - neg -1/12/21;  HIV Screen -  neg -1/12/21 ; C-scope -6/1/22 0 hemorrhoids - repeat 10 yrs;  Prev PCP - Dr. Mcdermott;  Ob/GYN - Dr. Sotomayor; Rheum - Dr. Kelly; Prev GI - Dr. Hutson; ENT - Dr. Toscano; Hand Sx - Dr. Monzon;  Well visit -  7/14/22 - with Dr. Gaming    Patient Care Team:  Lizzie Huff MD as PCP - General (Internal Medicine)  Babs Greenberg MA as Care Coordinator  Ab Valenzuela Jr., MD as Consulting Physician (Gastroenterology)     Health Maintenance:  Immunization History   Administered Date(s) Administered    COVID-19 MRNA, LN-S PF (MODERNA HALF 0.25 ML DOSE) 11/29/2021, 07/26/2022    COVID-19 Vaccine 02/03/2021, 03/03/2021    Influenza 11/07/2020    Influenza - Quadrivalent 10/29/2019    Influenza - Quadrivalent - MDCK - PF 12/28/2021    Influenza - Quadrivalent - PF *Preferred* (6 months and older) 11/08/2018, 10/31/2022    Influenza - Trivalent (ADULT) 10/17/2008    Influenza - Trivalent - PF (ADULT) 01/11/2013, 10/05/2015    Pneumococcal Conjugate - 20 Valent 07/31/2023    Tdap 10/05/2015      Health Maintenance   Topic Date Due    Mammogram  02/02/2024    TETANUS VACCINE  10/05/2025    DEXA Scan  04/04/2027    Lipid Panel  11/03/2027    Hepatitis C Screening  Completed        Past Medical History:  Past Medical History:   Diagnosis Date    Abdominal pain 2/4/2023    Anxiety     Gallstones     Hepatic cyst 1/6/2021    High cholesterol     Hypothyroid     Migraines     Pruritus 1/11/2023    Renal cyst, left 1/6/2021    Stress incontinence (female) (male) 12/13/2012    Note: Unchanged       Past Surgical History:   has a past surgical history that includes Tubal ligation; Shoulder surgery (Right); Appendectomy (1976); Oophorectomy (1987); LUMPECTOMY,BREAST,WITH RADIOACTIVE SEED LOCALIZATION AND SENTINEL LYMPH NODE BIOPSY (2017); Cholecystectomy (10/01/2020); Partial hysterectomy (1986); bladder lift (2014); and Breast biopsy (Left, 2018).    Family History:  family history includes Alzheimer's disease in her mother; Breast cancer in her maternal aunt and maternal aunt; Diabetes in her father; Lung cancer in her father; Pacemaker/defibrilator in her mother.     Social History:  Social History     Tobacco Use     Smoking status: Never    Smokeless tobacco: Never   Substance Use Topics    Alcohol use: Not Currently     Comment: social    Drug use: Never       Review of Systems   Constitutional:  Negative for chills, diaphoresis and fever.   HENT:  Positive for nasal congestion. Negative for hearing loss, rhinorrhea and sore throat.    Eyes:  Negative for visual disturbance.   Respiratory:  Negative for cough, chest tightness and shortness of breath.    Cardiovascular:  Negative for chest pain.   Gastrointestinal:  Negative for abdominal pain, constipation, diarrhea, nausea and vomiting.   Genitourinary:  Negative for dysuria and frequency.   Musculoskeletal:  Negative for arthralgias and myalgias.   Neurological:  Negative for dizziness and headaches.   Psychiatric/Behavioral:  Negative for dysphoric mood and suicidal ideas. The patient is nervous/anxious.         Anxiety > Depression        Medications:    Current Outpatient Medications:     ALPRAZolam (XANAX) 0.25 MG tablet, TAKE 1 TABLET BY MOUTH DAILY AS NEEDED FOR ANXIETY, Disp: 30 tablet, Rfl: 0    amitriptyline (ELAVIL) 25 MG tablet, Take 1 tablet (25 mg total) by mouth nightly as needed for Insomnia., Disp: 30 tablet, Rfl: 5    chlordiazepoxide-clidinium 5-2.5 mg (LIBRAX) 5-2.5 mg Cap, Take 1 capsule by mouth 2 (two) times daily as needed., Disp: , Rfl:     hydrOXYzine HCL (ATARAX) 25 MG tablet, Take 1 tablet (25 mg total) by mouth 3 (three) times daily as needed for Itching., Disp: 90 tablet, Rfl: 0    levothyroxine (SYNTHROID) 137 MCG Tab tablet, Take 1 tablet (137 mcg total) by mouth before breakfast., Disp: 90 tablet, Rfl: 0    phentermine (ADIPEX-P) 37.5 mg tablet, Take 37.5 mg by mouth., Disp: , Rfl:     sumatriptan (IMITREX) 50 MG tablet, 1 po at onset of migraine and can repeat in 2 hrs - max of 2 in 24 hrs, Disp: 12 tablet, Rfl: 0    ipratropium (ATROVENT) 42 mcg (0.06 %) nasal spray, 2 sprays by Nasal route 2 (two) times daily. (Patient not taking: Reported  "on 7/31/2023), Disp: 15 mL, Rfl: 3    pneumoc 20-jessy conj-dip cr,PF, (PREVNAR 20, PF,) 0.5 mL Syrg injection, Inject into the muscle., Disp: 0.5 mL, Rfl: 0    promethazine (PHENERGAN) 25 MG tablet, Take 1 tablet (25 mg total) by mouth every 8 (eight) hours as needed. (Patient not taking: Reported on 1/11/2023), Disp: 30 tablet, Rfl: 0    rosuvastatin (CRESTOR) 40 MG Tab, Take 1 tablet (40 mg total) by mouth once daily., Disp: 90 tablet, Rfl: 1    venlafaxine (EFFEXOR-XR) 37.5 MG 24 hr capsule, Take 1 capsule (37.5 mg total) by mouth once daily., Disp: 90 capsule, Rfl: 0     Allergies:  Review of patient's allergies indicates:   Allergen Reactions    Bactrim [sulfamethoxazole-trimethoprim] Nausea Only       Physical Exam      Vital Signs  Temp: 97.9 °F (36.6 °C)  Temp Source: Oral  Pulse: 66  Resp: 15  SpO2: 99 %  BP: 120/82  Pain Score: 0-No pain  Height and Weight  Height: 5' 2" (157.5 cm)  Weight: 73 kg (160 lb 15 oz)  BSA (Calculated - sq m): 1.79 sq meters  BMI (Calculated): 29.4  Weight in (lb) to have BMI = 25: 136.4             Physical Exam  Vitals reviewed.   Constitutional:       General: She is not in acute distress.     Appearance: Normal appearance. She is not ill-appearing, toxic-appearing or diaphoretic.   HENT:      Head: Normocephalic and atraumatic.      Right Ear: Tympanic membrane normal.      Left Ear: Tympanic membrane normal.   Eyes:      Extraocular Movements: Extraocular movements intact.      Conjunctiva/sclera: Conjunctivae normal.      Pupils: Pupils are equal, round, and reactive to light.   Neck:      Vascular: No carotid bruit.   Cardiovascular:      Rate and Rhythm: Normal rate and regular rhythm.      Pulses: Normal pulses.      Heart sounds: Normal heart sounds.   Pulmonary:      Effort: Pulmonary effort is normal. No respiratory distress.      Breath sounds: Normal breath sounds.   Abdominal:      General: Bowel sounds are normal. There is no distension.      Palpations: Abdomen is " soft.      Tenderness: There is no abdominal tenderness. There is no guarding or rebound.   Musculoskeletal:      Cervical back: Neck supple. No tenderness.   Neurological:      General: No focal deficit present.      Mental Status: She is alert and oriented to person, place, and time.   Psychiatric:         Mood and Affect: Mood normal.         Behavior: Behavior normal.          Laboratory:  CBC:  Recent Labs   Lab 01/12/21  1100 01/06/22  0937 01/11/23  0846   WBC 6.61 8.17 7.15   RBC 4.77 4.94 4.91   Hemoglobin 13.9 14.6 14.1   Hematocrit 43.4 43.7 43.9   Platelets 241 276 262   MCV 91 89 89   MCH 29.1 29.6 28.7   MCHC 32.0 33.4 32.1       CMP:  Recent Labs   Lab 01/12/21  1100 05/13/21  0844 01/06/22  0938 01/11/23  0846   Glucose 103  --  107 111 H   Calcium 8.8  --  9.5 9.5   Albumin 3.8  --  4.0 4.2   Total Protein 6.6  --  7.1 7.2   Sodium 140  --  141 141   Potassium 4.5  --  4.4 3.7   CO2 27  --  23 26   Chloride 104  --  106 105   BUN 15  --  13 14   Creatinine 0.8   < > 0.7 0.8  0.8   Alkaline Phosphatase 66  --  70 77   ALT 18  --  29 25   AST 15  --  19 21   Total Bilirubin 0.2  --  0.4 0.7    < > = values in this interval not displayed.          LIPIDS:  Recent Labs   Lab 01/06/22  0937 01/06/22  0938 07/13/22  1000 11/03/22  1028 01/11/23  0846   TSH 5.297 H  --   --  5.678 H 0.022 L   HDL  --  52 53 44  --    Cholesterol  --  285 H 271 H 283 H  --    Triglycerides  --  322 H 278 H 206 H  --    LDL Cholesterol  --  168.6 H 162.4 H 197.8 H  --    HDL/Cholesterol Ratio  --  18.2 L 19.6 L 15.5 L  --    Non-HDL Cholesterol  --  233 218 239  --    Total Cholesterol/HDL Ratio  --  5.5 H 5.1 H 6.4 H  --        TSH:  Recent Labs   Lab 01/06/22  0937 11/03/22  1028 01/11/23  0846   TSH 5.297 H 5.678 H 0.022 L       A1C:  Recent Labs   Lab 11/03/22  1028   Hemoglobin A1C 5.5            Recent Labs   Lab 01/12/21  1100   Hepatitis C Ab Negative       Assessment/Plan     Mayelin Janecon is a 63 y.o.female  with:    Hyperlipidemia, unspecified hyperlipidemia type  -     rosuvastatin (CRESTOR) 40 MG Tab; Take 1 tablet (40 mg total) by mouth once daily.  Dispense: 90 tablet; Refill: 1  -     Lipid Panel; Future; Expected date: 07/31/2023  -     Comprehensive Metabolic Panel; Future; Expected date: 07/31/2023  - Stable.  Cont current regimen. Repeat FLP with next labs.    Situational anxiety  -     venlafaxine (EFFEXOR-XR) 37.5 MG 24 hr capsule; Take 1 capsule (37.5 mg total) by mouth once daily.  Dispense: 90 capsule; Refill: 0  - will start a trial of Venlafaxine ER.  Warned pt about potential Side effects. Pt aware of FDA/BB warning. Pt aware of potential for withdrawal with abrupt discontinuation. Pt aware it will take 4-6 wks to feel full effects of this medication.  Discussed poss S.E. Pt will alert MD for any concerns incl SI/HI.  RTC in 6 wks for fu.      Hypothyroidism, unspecified type  -     TSH; Future; Expected date: 07/31/2023  -     T4, Free; Future; Expected date: 07/31/2023  -Repeat TFT's. Cont current.    Fatty liver  - Rec limit tylenol and alcohol.  Rec diet and exercise for wt loss.  Fu by hepatology.     Other migraine without status migrainosus, not intractable  - Stable.  Cont current regimen.    Positive KEYUR (antinuclear antibody)  -   Fu by Dr. Owens. Lupus panel neg.     Need for pneumococcal 20-valent conjugate vaccination  -     (In Office Administered) Pneumococcal Conjugate Vaccine (20 Valent) (IM)  - Admin today.     Chronic conditions status updated as per HPI.  Other than changes above, cont current medications and maintain follow up with specialists.    Follow up in about 15 weeks (around 11/13/2023) for fu chronic issues or sooner if needed.      Lizzie Huff MD  [facility]

## 2023-07-31 ENCOUNTER — OFFICE VISIT (OUTPATIENT)
Dept: PRIMARY CARE CLINIC | Facility: CLINIC | Age: 63
End: 2023-07-31
Payer: MEDICARE

## 2023-07-31 VITALS
RESPIRATION RATE: 15 BRPM | WEIGHT: 160.94 LBS | SYSTOLIC BLOOD PRESSURE: 120 MMHG | OXYGEN SATURATION: 99 % | TEMPERATURE: 98 F | DIASTOLIC BLOOD PRESSURE: 82 MMHG | HEIGHT: 62 IN | HEART RATE: 66 BPM | BODY MASS INDEX: 29.62 KG/M2

## 2023-07-31 DIAGNOSIS — R76.8 POSITIVE ANA (ANTINUCLEAR ANTIBODY): ICD-10-CM

## 2023-07-31 DIAGNOSIS — G43.809 OTHER MIGRAINE WITHOUT STATUS MIGRAINOSUS, NOT INTRACTABLE: ICD-10-CM

## 2023-07-31 DIAGNOSIS — E78.5 HYPERLIPIDEMIA, UNSPECIFIED HYPERLIPIDEMIA TYPE: Primary | ICD-10-CM

## 2023-07-31 DIAGNOSIS — K76.0 FATTY LIVER: ICD-10-CM

## 2023-07-31 DIAGNOSIS — Z23 NEED FOR PNEUMOCOCCAL 20-VALENT CONJUGATE VACCINATION: ICD-10-CM

## 2023-07-31 DIAGNOSIS — E03.9 HYPOTHYROIDISM, UNSPECIFIED TYPE: ICD-10-CM

## 2023-07-31 DIAGNOSIS — F41.8 SITUATIONAL ANXIETY: ICD-10-CM

## 2023-07-31 PROCEDURE — 99214 OFFICE O/P EST MOD 30 MIN: CPT | Mod: S$PBB,,, | Performed by: INTERNAL MEDICINE

## 2023-07-31 PROCEDURE — 99999PBSHW PNEUMOCOCCAL CONJUGATE VACCINE 20-VALENT: Mod: PBBFAC,,,

## 2023-07-31 PROCEDURE — 99214 PR OFFICE/OUTPT VISIT, EST, LEVL IV, 30-39 MIN: ICD-10-PCS | Mod: S$PBB,,, | Performed by: INTERNAL MEDICINE

## 2023-07-31 PROCEDURE — 99999PBSHW PNEUMOCOCCAL CONJUGATE VACCINE 20-VALENT: ICD-10-PCS | Mod: PBBFAC,,,

## 2023-07-31 PROCEDURE — 90677 PCV20 VACCINE IM: CPT | Mod: PBBFAC,PN

## 2023-07-31 PROCEDURE — 99214 OFFICE O/P EST MOD 30 MIN: CPT | Mod: PBBFAC,PN | Performed by: INTERNAL MEDICINE

## 2023-07-31 PROCEDURE — 99999 PR PBB SHADOW E&M-EST. PATIENT-LVL IV: CPT | Mod: PBBFAC,,, | Performed by: INTERNAL MEDICINE

## 2023-07-31 PROCEDURE — 99999 PR PBB SHADOW E&M-EST. PATIENT-LVL IV: ICD-10-PCS | Mod: PBBFAC,,, | Performed by: INTERNAL MEDICINE

## 2023-07-31 RX ORDER — VENLAFAXINE HYDROCHLORIDE 37.5 MG/1
37.5 CAPSULE, EXTENDED RELEASE ORAL DAILY
Qty: 90 CAPSULE | Refills: 0 | Status: SHIPPED | OUTPATIENT
Start: 2023-07-31 | End: 2023-12-04

## 2023-07-31 RX ORDER — PREDNISONE 20 MG/1
20 TABLET ORAL
COMMUNITY
Start: 2023-04-26 | End: 2023-07-31

## 2023-07-31 RX ORDER — PHENTERMINE HYDROCHLORIDE 37.5 MG/1
37.5 TABLET ORAL
COMMUNITY
Start: 2023-06-09 | End: 2023-12-04

## 2023-07-31 RX ORDER — CIPROFLOXACIN HYDROCHLORIDE 500 MG/1
500 TABLET, FILM COATED ORAL 2 TIMES DAILY
COMMUNITY
Start: 2023-03-20 | End: 2023-07-31

## 2023-07-31 RX ORDER — METRONIDAZOLE 500 MG/1
500 TABLET ORAL 2 TIMES DAILY
COMMUNITY
Start: 2023-03-17 | End: 2023-07-31

## 2023-07-31 RX ORDER — GABAPENTIN 100 MG/1
100 CAPSULE ORAL 4 TIMES DAILY
COMMUNITY
Start: 2023-03-01 | End: 2023-07-31

## 2023-07-31 RX ORDER — ROSUVASTATIN CALCIUM 40 MG/1
40 TABLET, COATED ORAL DAILY
Qty: 90 TABLET | Refills: 1 | Status: SHIPPED | OUTPATIENT
Start: 2023-07-31

## 2023-07-31 NOTE — NURSING
Two patient identifiers verified.    Allergies reviewed.    Prevnar 20 administered IM to left arm per MD order.    Patient tolerated injection well; no redness, bleeding, or bruising noted to injection site.    Patient instructed to remain in clinic setting for 15 minutes. Verbalizes understanding.

## 2023-08-11 ENCOUNTER — PATIENT MESSAGE (OUTPATIENT)
Dept: PRIMARY CARE CLINIC | Facility: CLINIC | Age: 63
End: 2023-08-11
Payer: MEDICARE

## 2023-11-30 ENCOUNTER — LAB VISIT (OUTPATIENT)
Dept: LAB | Facility: HOSPITAL | Age: 63
End: 2023-11-30
Attending: INTERNAL MEDICINE
Payer: MEDICARE

## 2023-11-30 DIAGNOSIS — E78.5 HYPERLIPIDEMIA, UNSPECIFIED HYPERLIPIDEMIA TYPE: ICD-10-CM

## 2023-11-30 DIAGNOSIS — E03.9 HYPOTHYROIDISM, UNSPECIFIED TYPE: ICD-10-CM

## 2023-11-30 LAB
ALBUMIN SERPL BCP-MCNC: 4.3 G/DL (ref 3.5–5.2)
ALP SERPL-CCNC: 70 U/L (ref 55–135)
ALT SERPL W/O P-5'-P-CCNC: 24 U/L (ref 10–44)
ANION GAP SERPL CALC-SCNC: 9 MMOL/L (ref 8–16)
AST SERPL-CCNC: 20 U/L (ref 10–40)
BILIRUB SERPL-MCNC: 0.5 MG/DL (ref 0.1–1)
BUN SERPL-MCNC: 15 MG/DL (ref 8–23)
CALCIUM SERPL-MCNC: 9.4 MG/DL (ref 8.7–10.5)
CHLORIDE SERPL-SCNC: 104 MMOL/L (ref 95–110)
CHOLEST SERPL-MCNC: 193 MG/DL (ref 120–199)
CHOLEST/HDLC SERPL: 3.6 {RATIO} (ref 2–5)
CO2 SERPL-SCNC: 29 MMOL/L (ref 23–29)
CREAT SERPL-MCNC: 0.8 MG/DL (ref 0.5–1.4)
EST. GFR  (NO RACE VARIABLE): >60 ML/MIN/1.73 M^2
GLUCOSE SERPL-MCNC: 98 MG/DL (ref 70–110)
HDLC SERPL-MCNC: 53 MG/DL (ref 40–75)
HDLC SERPL: 27.5 % (ref 20–50)
LDLC SERPL CALC-MCNC: 120.8 MG/DL (ref 63–159)
NONHDLC SERPL-MCNC: 140 MG/DL
POTASSIUM SERPL-SCNC: 4.3 MMOL/L (ref 3.5–5.1)
PROT SERPL-MCNC: 7.2 G/DL (ref 6–8.4)
SODIUM SERPL-SCNC: 142 MMOL/L (ref 136–145)
T4 FREE SERPL-MCNC: 0.88 NG/DL (ref 0.71–1.51)
TRIGL SERPL-MCNC: 96 MG/DL (ref 30–150)
TSH SERPL DL<=0.005 MIU/L-ACNC: 3.69 UIU/ML (ref 0.4–4)

## 2023-11-30 PROCEDURE — 80061 LIPID PANEL: CPT | Performed by: INTERNAL MEDICINE

## 2023-11-30 PROCEDURE — 84443 ASSAY THYROID STIM HORMONE: CPT | Performed by: INTERNAL MEDICINE

## 2023-11-30 PROCEDURE — 36415 COLL VENOUS BLD VENIPUNCTURE: CPT | Performed by: INTERNAL MEDICINE

## 2023-11-30 PROCEDURE — 84439 ASSAY OF FREE THYROXINE: CPT | Performed by: INTERNAL MEDICINE

## 2023-11-30 PROCEDURE — 80053 COMPREHEN METABOLIC PANEL: CPT | Performed by: INTERNAL MEDICINE

## 2023-12-01 NOTE — PROGRESS NOTES
I sent pt a my chart message -  I reviewed your labs.  Your thyroid functions are normal.  Your Cholesterol looked good on your current dose of Crestor.  Your kidney function and liver functions looked good. Continue your current regimen.  No further recommendations at this time. Happy Holidays!    Dr. BOTELLO

## 2023-12-03 NOTE — PROGRESS NOTES
[facility] Note    Chief Complaint      Chief Complaint   Patient presents with    Follow-up       History of Present Illness      Mayelin Chowdary is a 63 y.o.  WF with Fatty liver, Hepatic cysts, Left parapelvic cyst, Obesity, and migraines presents to fu chronic issues. Referred by Kinza Lieberman. 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  I saw her AntiSmooth muscle Antibody that Hepatology ordered was mildly positive and defer to them on that matter as this is usually a test for autoimmune hepatitis but her level was barely elevated so not sure this is clinically significant. She fu with Dr. Kelly.      Fatty Liver - Seen on MRI 10/16/18 at [facility]. Rec limit APAP/ETOH.  Rec diet and exercise for wt loss.  D/w pt potential for cirrhosis. CTA Abd- 1/12/23  - Hepatic steatosis.  Stable hepatic cysts and arterial enhancing lesion in the right hepatic lobe that could represent arterial portal shunting.  No suspicious lesions identified.Increased mesenteric stranding in the mid abdomen with multiple prominent mesenteric lymph nodes.  Findings likely represent mesenteric adenitis and are similar to prior MRI.   Mri abd - 12/2/22 -  + Fatty liver/hepatosteatosis, Multiple liver cysts largest of which is 2 cm. There is a small focus of arterial portal shunting enhancing in the inferior right hepatic lobe. Unsure of the cause of this finding possible due to one of the cysts compressing things? Multiple parapelvic renal cysts, primarily in left kidney.I referred her to Hepatology to further investigate.  Fibrosan - 1/12/23- 0-2; S - 3.  Fu by hepatology.  FIB-4 Calculation: 1.01 at 12/4/2023  7:53 AM   FIB-4 below 1.30 is considered as low-risk for advanced fibrosis  FIB-4 over 2.67 is considered as high-risk for advanced fibrosis  FIB-4 values between 1.30 and 2.67 are considered as intermediate-risk of advanced fibrosis for ages 36-64.   For ages > 64 the cut-off for  "low-risk goes to < 2.     H/o Pruritis - Resolved. Rx Hydroxyzine to take every 8 hrs as needed for itching and also rec she take Famotidine  (Pepcid OTC) 20 mg once-twice daily - this is an acid reduced but helps with hives as it is a different type of antihistamine.  Thsis helps when she takes it which is sparingly.  She fu with Derm, Dr. [facility], and A/I, Dr. Santana. Allergy testing was neg. Per pt. She reports itching after taking Tylenol so she avoids it.      Hepatic Cysts - Seen on MRI 10/16/18 at [facility]. Fu by Hepatology.     Left parapelvic renal cyst - seen on MRI 10/16/18 at [facility].  Multiple parapelvic renal cysts, primarily in left kidney.     Overweight - BMI - 30.65-  Pt off Phentermine.    She walks most days and plans to start a low carb diet.      HLD - Controlled on Crestor to 40 mg daily. Continue a low cholesterol diet and exercise. Pt can visit the American heart association website at heart.org for further info on a low cholesterol diet.       Migraine - HA- Has not had one in the past 3 mos. She cut back on dairy and is on Amitripyline 10 mg/d.   Had Migraines in her 30's and it went away but started again several mos ago. Tylenol, Excedrine migraine, and ibuprofen no help. No photophobia with recent HA. No phonophobia. No aura.  Sometimes wakes up with the HA.  No worse with bending coughing.  No assoc N/V.  Fioricet no help. 7/10 in severity.  She thinks maxalt helped when she was younger. Pt has Imitrex prn. She has not been sleeping well and has been eating late at night.   Pt on amitriptyline 25 mcg/d - no change in sleep but HA controlled.  Doing well.      Anxiety -  She has always had flight anxiety. She has taken amitriptyline in past but was not for migraines. "I've got a lot of anxiety".  has been ill.  Pt not on Venlafaxine ER.   She takes alprazolam prn sparingly.  . Cont current for now. Pt prefers prn medication.      Depression - Pt scored 4 on PHQ2 and 13 on PHQ 9 - c/w " moderate depression.Pt not on Venlafaxine ER.   Doing well.  had to have toe amputated due to melanoma. He is going to [facility] and is planning for Tx for 9 wks.      Hypothyroidism  -TSH controlled on Levothyroxine to 137 mcg daily. Pt taking this medication by itself at least 30 minutes prior to any other meds or food.  She fu with Dr. Steffanie Clay.   Thyroid U/S - 1/31/22 -  thyroid Ultrasound. Heterogenous thyroid parenchyma without discrete nodule, similar to prior, and may reflect sequela of chronic/prior thyroiditis.     Carpal tunnel - Numbness tyrel hands - for the past couple mos.  Worse at night. She saw Dr. Kelly for this. She was told to sleep with wrist splints tyrel which helps but even driving or brushing her hair the hands go numb.  Steroid inj helped in past. Doing well s/p recent injections. Could consider EMG in future and referral back to Dr. Monzon in future if needed.     Insomnia - Pt would like to try to inc the amitriptyline rather than add Trazodone. No snoring.     Memory issues - Pt forgot to put coffee pot under the coffee machine and coffee went everywhere. She called her grandkids her nieces and she doesn't have any nieces.  Her  and daughter have noticed. Of note, she has not been sleeping well. She has been under inc stress. She and  have been going back and forth to [facility] and recently spent 3 mos there.  They go back this weekend until Jan. Consider Neuropsych eval in future.      HCM - Flu - admin 12/4/23;  Tdap - 10/5/15;  PCV 20 -  7/31/23;  Shingrix - none - defers;  COVID -19 vaccine (Moderna) #1 -2/3/21 #2 - 3/3/21; # 3 - 11/29/21; # 4 7/26/22; # 5 declines; MGM - 2/2/23- repeat 1 yr;  DEXA - 4/4/23 - wnl; PAP - s/p hys in 20's and unilateral salpingoophorectomy; Hep C Screen - neg -1/12/21;  HIV Screen -  neg -1/12/21 ; C-scope -6/1/22 - hemorrhoids - repeat 10 yrs;  Prev PCP - Dr. Mcdermott;  Ob/GYN - Dr. Sotomayor; Rheum - Dr. Kelly; Prev GI - Dr. Hutson; ENT -  Dr. Toscano; Hand Sx - Dr. Monzon;  Well visit - 7/14/22 - with Dr. Gaming    Patient Care Team:  Lizzie Huff MD as PCP - General (Internal Medicine)  Babs rGeenberg MA as Care Coordinator  Ab Valenzuela Jr., MD as Consulting Physician (Gastroenterology)     Health Maintenance:  Immunization History   Administered Date(s) Administered    COVID-19 MRNA, LN-S PF (MODERNA HALF 0.25 ML DOSE) 11/29/2021, 07/26/2022    COVID-19 Vaccine 02/03/2021, 03/03/2021    Influenza 11/07/2020    Influenza (FLUAD) - Quadrivalent - Adjuvanted - PF *Preferred* (65+) 12/04/2023    Influenza - Quadrivalent 10/29/2019    Influenza - Quadrivalent - MDCK - PF 12/28/2021    Influenza - Quadrivalent - PF *Preferred* (6 months and older) 11/08/2018, 10/31/2022    Influenza - Trivalent (ADULT) 10/17/2008    Influenza - Trivalent - PF (ADULT) 01/11/2013, 10/05/2015    Pneumococcal Conjugate - 20 Valent 07/31/2023    Tdap 10/05/2015      Health Maintenance   Topic Date Due    Shingles Vaccine (1 of 2) Never done    Mammogram  02/02/2024    TETANUS VACCINE  10/05/2025    DEXA Scan  04/04/2027    Lipid Panel  11/30/2028    Colorectal Cancer Screening  06/01/2032    Hepatitis C Screening  Completed        Past Medical History:  Past Medical History:   Diagnosis Date    Abdominal pain 2/4/2023    Anxiety     Gallstones     Hepatic cyst 1/6/2021    High cholesterol     Hypothyroid     Migraines     Pruritus 1/11/2023    Renal cyst, left 1/6/2021    Stress incontinence (female) (male) 12/13/2012    Note: Unchanged       Past Surgical History:   has a past surgical history that includes Tubal ligation; Shoulder surgery (Right); Appendectomy (1976); Oophorectomy (1987); LUMPECTOMY,BREAST,WITH RADIOACTIVE SEED LOCALIZATION AND SENTINEL LYMPH NODE BIOPSY (2017); Cholecystectomy (10/01/2020); Partial hysterectomy (1986); bladder lift (2014); and Breast biopsy (Left, 2018).    Family History:  family history includes  Alzheimer's disease in her mother; Breast cancer in her maternal aunt and maternal aunt; Diabetes in her father; Lung cancer in her father; Pacemaker/defibrilator in her mother.     Social History:  Social History     Tobacco Use    Smoking status: Never    Smokeless tobacco: Never   Substance Use Topics    Alcohol use: Not Currently     Comment: social    Drug use: Never       Review of Systems   Constitutional:  Negative for chills, diaphoresis and fever.   HENT:  Negative for hearing loss.    Eyes:  Negative for visual disturbance.   Respiratory:  Negative for chest tightness and shortness of breath.    Cardiovascular:  Negative for chest pain and palpitations.   Gastrointestinal:  Positive for abdominal pain. Negative for blood in stool, constipation, diarrhea, nausea and vomiting.        Epigastric pain off and on   Endocrine: Positive for heat intolerance.   Genitourinary:  Negative for bladder incontinence, dysuria, frequency and hematuria.   Musculoskeletal:  Positive for back pain. Negative for arthralgias and myalgias.        Low back pain radiates down left leg.    Neurological:  Positive for memory loss.   Psychiatric/Behavioral:  Positive for sleep disturbance. Negative for dysphoric mood. The patient is nervous/anxious.         Medications:    Current Outpatient Medications:     chlordiazepoxide-clidinium 5-2.5 mg (LIBRAX) 5-2.5 mg Cap, Take 1 capsule by mouth 2 (two) times daily as needed., Disp: , Rfl:     hydrOXYzine HCL (ATARAX) 25 MG tablet, Take 1 tablet (25 mg total) by mouth 3 (three) times daily as needed for Itching., Disp: 90 tablet, Rfl: 0    rosuvastatin (CRESTOR) 40 MG Tab, Take 1 tablet (40 mg total) by mouth once daily., Disp: 90 tablet, Rfl: 1    sumatriptan (IMITREX) 50 MG tablet, 1 po at onset of migraine and can repeat in 2 hrs - max of 2 in 24 hrs, Disp: 12 tablet, Rfl: 0    ALPRAZolam (XANAX) 0.25 MG tablet, TAKE 1 TABLET BY MOUTH DAILY AS NEEDED FOR ANXIETY, Disp: 30 tablet, Rfl:  "0    amitriptyline (ELAVIL) 50 MG tablet, Take 1 tablet (50 mg total) by mouth nightly as needed for Insomnia., Disp: 30 tablet, Rfl: 5    flu vac 2023 [id number],PF, (FLUAD QUAD 2023-24,65Y UP,,PF,) 60 mcg (15 mcg x 4)/0.5 mL Syrg, Inject 0.5 mLs into the muscle once. for 1 dose, Disp: 0.5 mL, Rfl: 0    ipratropium (ATROVENT) 42 mcg (0.06 %) nasal spray, 2 sprays by Each Nostril route 2 (two) times daily., Disp: 15 mL, Rfl: 3    levothyroxine (SYNTHROID) 137 MCG Tab tablet, Take 1 tablet (137 mcg total) by mouth before breakfast., Disp: 90 tablet, Rfl: 0    promethazine (PHENERGAN) 25 MG tablet, Take 1 tablet (25 mg total) by mouth every 8 (eight) hours as needed., Disp: 20 tablet, Rfl: 0     Allergies:  Review of patient's allergies indicates:   Allergen Reactions    Bactrim [sulfamethoxazole-trimethoprim] Nausea Only       Physical Exam      Vital Signs  Temp: 98.1 °F (36.7 °C)  Temp Source: Oral  Pulse: 69  Resp: 16  SpO2: 96 %  BP: 118/82  BP Location: Left arm  Patient Position: Sitting  Pain Score: 0-No pain  Height and Weight  Height: 5' 2" (157.5 cm)  Weight: 76 kg (167 lb 8.8 oz)  BSA (Calculated - sq m): 1.82 sq meters  BMI (Calculated): 30.6  Weight in (lb) to have BMI = 25: 136.4      Patient Position: Sitting      Physical Exam  Vitals reviewed.   Constitutional:       General: She is not in acute distress.     Appearance: Normal appearance. She is not ill-appearing, toxic-appearing or diaphoretic.   HENT:      Head: Normocephalic and atraumatic.      Right Ear: Tympanic membrane normal.      Left Ear: Tympanic membrane normal.      Mouth/Throat:      Mouth: Mucous membranes are moist.      Pharynx: No posterior oropharyngeal erythema.   Eyes:      Extraocular Movements: Extraocular movements intact.      Conjunctiva/sclera: Conjunctivae normal.      Pupils: Pupils are equal, round, and reactive to light.   Neck:      Vascular: No carotid bruit.   Cardiovascular:      Rate and Rhythm: Normal rate " and regular rhythm.      Pulses: Normal pulses.      Heart sounds: Normal heart sounds.   Pulmonary:      Effort: Pulmonary effort is normal. No respiratory distress.      Breath sounds: Normal breath sounds.   Abdominal:      General: Bowel sounds are normal. There is no distension.      Palpations: Abdomen is soft.      Tenderness: There is no abdominal tenderness. There is no guarding or rebound.   Musculoskeletal:      Cervical back: Neck supple. No tenderness.      Comments: Arthritic changes to hands   Neurological:      General: No focal deficit present.      Mental Status: She is alert and oriented to person, place, and time.   Psychiatric:         Mood and Affect: Mood normal.         Behavior: Behavior normal.          Laboratory:  CBC:  Recent Labs   Lab 01/12/21  1100 01/06/22  0937 01/11/23  0846   WBC 6.61 8.17 7.15   RBC 4.77 4.94 4.91   Hemoglobin 13.9 14.6 14.1   Hematocrit 43.4 43.7 43.9   Platelets 241 276 262   MCV 91 89 89   MCH 29.1 29.6 28.7   MCHC 32.0 33.4 32.1       CMP:  Recent Labs   Lab 01/06/22  0938 01/11/23  0846 11/30/23  1112   Glucose 107 111 H 98   Calcium 9.5 9.5 9.4   Albumin 4.0 4.2 4.3   Total Protein 7.1 7.2 7.2   Sodium 141 141 142   Potassium 4.4 3.7 4.3   CO2 23 26 29   Chloride 106 105 104   BUN 13 14 15   Creatinine 0.7 0.8  0.8 0.8   Alkaline Phosphatase 70 77 70   ALT 29 25 24   AST 19 21 20   Total Bilirubin 0.4 0.7 0.5       LIPIDS:  Recent Labs   Lab 07/13/22  1000 11/03/22  1028 01/11/23  0846 11/30/23  1112   TSH  --  5.678 H 0.022 L 3.686   HDL 53 44  --  53   Cholesterol 271 H 283 H  --  193   Triglycerides 278 H 206 H  --  96   LDL Cholesterol 162.4 H 197.8 H  --  120.8   HDL/Cholesterol Ratio 19.6 L 15.5 L  --  27.5   Non-HDL Cholesterol 218 239  --  140   Total Cholesterol/HDL Ratio 5.1 H 6.4 H  --  3.6       TSH:  Recent Labs   Lab 11/03/22  1028 01/11/23  0846 11/30/23  1112   TSH 5.678 H 0.022 L 3.686       A1C:  Recent Labs   Lab 11/03/22  1028    Hemoglobin A1C 5.5       Recent Labs   Lab 12/04/23  0837   Vitamin B-12 673     Recent Labs   Lab 01/12/21  1100   Hepatitis C Ab Negative       Assessment/Plan     Mayelin Chowdary is a 63 y.o.female with:    Hypothyroidism, unspecified type  -     levothyroxine (SYNTHROID) 137 MCG Tab tablet; Take 1 tablet (137 mcg total) by mouth before breakfast.  Dispense: 90 tablet; Refill: 0  - Controlled.  Cont current.     Other migraine without status migrainosus, not intractable  -     promethazine (PHENERGAN) 25 MG tablet; Take 1 tablet (25 mg total) by mouth every 8 (eight) hours as needed.  Dispense: 20 tablet; Refill: 0  -     amitriptyline (ELAVIL) 50 MG tablet; Take 1 tablet (50 mg total) by mouth nightly as needed for Insomnia.  Dispense: 30 tablet; Refill: 5  - Controlled.  Cont current.  Am inc amitriptyline to see if helps with sleep.    Rhinitis, unspecified type  -     ipratropium (ATROVENT) 42 mcg (0.06 %) nasal spray; 2 sprays by Each Nostril route 2 (two) times daily.  Dispense: 15 mL; Refill: 3  - stable.   Situational anxiety  -     ALPRAZolam (XANAX) 0.25 MG tablet; TAKE 1 TABLET BY MOUTH DAILY AS NEEDED FOR ANXIETY  Dispense: 30 tablet; Refill: 0  - Stable.  Cont current regimen.    Fatty liver  -Rec limit tylenol and alcohol.  Rec diet and exercise for wt loss.       Hyperlipidemia, unspecified hyperlipidemia type  - Controlled.  Cont current.     Memory impairment  -     VITAMIN B12; Future; Expected date: 12/04/2023  -     RPR; Future; Expected date: 12/04/2023  -     Vitamin B1; Future; Expected date: 12/04/2023  - Will check b12, RPR,Vit B1. Recent TSH wnl. Consider Neuro Psych referral if needed in future. Cont to monitor.     Insomnia, unspecified type  - Am inc amitriptyline to see if helps with sleep. Pt will alert MD for any concerns.     Screening mammogram, encounter for  -     Mammo Digital Screening Bilat w/ Solomon; Future; Expected date: 02/03/2024    Need for prophylactic vaccination  and inoculation against influenza  -     Flu Vaccine - Quadrivalent (Adjuvanted) *Preferred* 65+  - Admin today.        Chronic conditions status updated as per HPI.  Other than changes above, cont current medications and maintain follow up with specialists.  Follow up in about 2 months (around 2/4/2024) for fu chronic issues or sooner if needed.      Lizzie Huff MD  [facility]

## 2023-12-04 ENCOUNTER — OFFICE VISIT (OUTPATIENT)
Dept: PRIMARY CARE CLINIC | Facility: CLINIC | Age: 63
End: 2023-12-04
Payer: MEDICARE

## 2023-12-04 ENCOUNTER — LAB VISIT (OUTPATIENT)
Dept: LAB | Facility: HOSPITAL | Age: 63
End: 2023-12-04
Attending: INTERNAL MEDICINE
Payer: MEDICARE

## 2023-12-04 VITALS
HEIGHT: 62 IN | DIASTOLIC BLOOD PRESSURE: 82 MMHG | SYSTOLIC BLOOD PRESSURE: 118 MMHG | HEART RATE: 69 BPM | WEIGHT: 167.56 LBS | RESPIRATION RATE: 16 BRPM | OXYGEN SATURATION: 96 % | TEMPERATURE: 98 F | BODY MASS INDEX: 30.83 KG/M2

## 2023-12-04 DIAGNOSIS — G47.00 INSOMNIA, UNSPECIFIED TYPE: ICD-10-CM

## 2023-12-04 DIAGNOSIS — Z12.31 SCREENING MAMMOGRAM, ENCOUNTER FOR: ICD-10-CM

## 2023-12-04 DIAGNOSIS — E03.9 HYPOTHYROIDISM, UNSPECIFIED TYPE: Primary | ICD-10-CM

## 2023-12-04 DIAGNOSIS — K76.0 FATTY LIVER: ICD-10-CM

## 2023-12-04 DIAGNOSIS — R41.3 MEMORY IMPAIRMENT: ICD-10-CM

## 2023-12-04 DIAGNOSIS — Z23 NEED FOR PROPHYLACTIC VACCINATION AND INOCULATION AGAINST INFLUENZA: ICD-10-CM

## 2023-12-04 DIAGNOSIS — J31.0 RHINITIS, UNSPECIFIED TYPE: ICD-10-CM

## 2023-12-04 DIAGNOSIS — G43.809 OTHER MIGRAINE WITHOUT STATUS MIGRAINOSUS, NOT INTRACTABLE: ICD-10-CM

## 2023-12-04 DIAGNOSIS — F41.8 SITUATIONAL ANXIETY: ICD-10-CM

## 2023-12-04 DIAGNOSIS — E78.5 HYPERLIPIDEMIA, UNSPECIFIED HYPERLIPIDEMIA TYPE: ICD-10-CM

## 2023-12-04 LAB — VIT B12 SERPL-MCNC: 673 PG/ML (ref 210–950)

## 2023-12-04 PROCEDURE — 82607 VITAMIN B-12: CPT | Performed by: INTERNAL MEDICINE

## 2023-12-04 PROCEDURE — 99214 OFFICE O/P EST MOD 30 MIN: CPT | Mod: S$PBB,,, | Performed by: INTERNAL MEDICINE

## 2023-12-04 PROCEDURE — G0008 ADMIN INFLUENZA VIRUS VAC: HCPCS | Mod: PBBFAC,PN

## 2023-12-04 PROCEDURE — 36415 COLL VENOUS BLD VENIPUNCTURE: CPT | Mod: PN | Performed by: INTERNAL MEDICINE

## 2023-12-04 PROCEDURE — 99999 PR PBB SHADOW E&M-EST. PATIENT-LVL IV: ICD-10-PCS | Mod: PBBFAC,,, | Performed by: INTERNAL MEDICINE

## 2023-12-04 PROCEDURE — 86592 SYPHILIS TEST NON-TREP QUAL: CPT | Performed by: INTERNAL MEDICINE

## 2023-12-04 PROCEDURE — 84425 ASSAY OF VITAMIN B-1: CPT | Performed by: INTERNAL MEDICINE

## 2023-12-04 PROCEDURE — 99214 OFFICE O/P EST MOD 30 MIN: CPT | Mod: PBBFAC,PN,25 | Performed by: INTERNAL MEDICINE

## 2023-12-04 PROCEDURE — 99999PBSHW FLU VACCINE - QUADRIVALENT - ADJUVANTED: ICD-10-PCS | Mod: PBBFAC,,,

## 2023-12-04 PROCEDURE — 99214 PR OFFICE/OUTPT VISIT, EST, LEVL IV, 30-39 MIN: ICD-10-PCS | Mod: S$PBB,,, | Performed by: INTERNAL MEDICINE

## 2023-12-04 PROCEDURE — 99999 PR PBB SHADOW E&M-EST. PATIENT-LVL IV: CPT | Mod: PBBFAC,,, | Performed by: INTERNAL MEDICINE

## 2023-12-04 PROCEDURE — 99999PBSHW FLU VACCINE - QUADRIVALENT - ADJUVANTED: Mod: PBBFAC,,,

## 2023-12-04 RX ORDER — IPRATROPIUM BROMIDE 42 UG/1
2 SPRAY, METERED NASAL 2 TIMES DAILY
Qty: 15 ML | Refills: 3 | Status: SHIPPED | OUTPATIENT
Start: 2023-12-04

## 2023-12-04 RX ORDER — LEVOTHYROXINE SODIUM 137 UG/1
137 TABLET ORAL
Qty: 90 TABLET | Refills: 0 | Status: SHIPPED | OUTPATIENT
Start: 2023-12-04 | End: 2024-02-22

## 2023-12-04 RX ORDER — PROMETHAZINE HYDROCHLORIDE 25 MG/1
25 TABLET ORAL EVERY 8 HOURS PRN
Qty: 20 TABLET | Refills: 0 | Status: SHIPPED | OUTPATIENT
Start: 2023-12-04

## 2023-12-04 RX ORDER — ALPRAZOLAM 0.25 MG/1
TABLET ORAL
Qty: 30 TABLET | Refills: 0 | Status: SHIPPED | OUTPATIENT
Start: 2023-12-04

## 2023-12-04 RX ORDER — AMITRIPTYLINE HYDROCHLORIDE 50 MG/1
50 TABLET, FILM COATED ORAL NIGHTLY PRN
Qty: 30 TABLET | Refills: 5 | Status: SHIPPED | OUTPATIENT
Start: 2023-12-04 | End: 2024-12-03

## 2023-12-04 NOTE — PROGRESS NOTES
Two patient identifiers verified.  Allergies reviewed. HD Flu  IM administered to left deltoid per MD order.  Patient tolerated injection well; no redness, bleeding, or bruising noted to injection site.  Patient instructed to remain in clinic setting for 15 minutes.  Verbalizes understanding.

## 2023-12-04 NOTE — PROGRESS NOTES
I sent pt a my chart message -  I reviewed your Vitamin B12 level which was normal.  I still await your vitamin B1 (thiamine) and RPR.  Dr. BOTELLO

## 2023-12-05 LAB — RPR SER QL: NORMAL

## 2023-12-07 LAB — VIT B1 BLD-MCNC: 60 UG/L (ref 38–122)

## 2023-12-11 NOTE — PROGRESS NOTES
I sent pt a my chart message -  I reviewed your labs. Your thiamine and RPR were both normal.  Dr. BOTELLO

## 2024-02-07 ENCOUNTER — HOSPITAL ENCOUNTER (OUTPATIENT)
Dept: RADIOLOGY | Facility: HOSPITAL | Age: 64
Discharge: HOME | End: 2024-02-07
Attending: INTERNAL MEDICINE
Payer: MEDICARE

## 2024-02-07 DIAGNOSIS — Z12.31 SCREENING MAMMOGRAM, ENCOUNTER FOR: ICD-10-CM

## 2024-02-07 DIAGNOSIS — E11.9 TYPE 2 DIABETES MELLITUS WITHOUT COMPLICATION: ICD-10-CM

## 2024-02-07 PROCEDURE — 77067 SCR MAMMO BI INCL CAD: CPT | Mod: 26,,, | Performed by: RADIOLOGY

## 2024-02-07 PROCEDURE — 77067 SCR MAMMO BI INCL CAD: CPT | Mod: TC

## 2024-02-07 PROCEDURE — 77063 BREAST TOMOSYNTHESIS BI: CPT | Mod: 26,,, | Performed by: RADIOLOGY

## 2024-02-07 NOTE — PROGRESS NOTES
I sent pt a my chart message -  I reviewed your Mammogram -   The breasts are heterogeneously dense, which may obscure small masses. There is no evidence of suspicious masses, calcifications, or other abnormal findings.  Impression:  Bilateral  There is no mammographic evidence of malignancy.  Routine screening mammogram in 1 year is recommended.

## 2024-02-22 DIAGNOSIS — E03.9 HYPOTHYROIDISM, UNSPECIFIED TYPE: ICD-10-CM

## 2024-02-22 RX ORDER — LEVOTHYROXINE SODIUM 137 UG/1
137 TABLET ORAL
Qty: 90 TABLET | Refills: 3 | Status: SHIPPED | OUTPATIENT
Start: 2024-02-22

## 2024-02-22 NOTE — TELEPHONE ENCOUNTER
No care due was identified.  Health [facility] Embedded Care Due Messages. Reference number: [id number].   2/22/2024 8:02:16 AM CST

## 2024-02-22 NOTE — TELEPHONE ENCOUNTER
Refill Decision Note   Mayelin Epi  is requesting a refill authorization.    Brief Assessment and Rationale for Refill:   Approve       Medication Therapy Plan:         Comments:     Note composed:8:44 AM 02/22/2024

## 2024-02-27 DIAGNOSIS — Z00.00 ENCOUNTER FOR MEDICARE ANNUAL WELLNESS EXAM: ICD-10-CM

## 2024-03-07 ENCOUNTER — PATIENT MESSAGE (OUTPATIENT)
Dept: PRIMARY CARE CLINIC | Facility: CLINIC | Age: 64
End: 2024-03-07
Payer: MEDICARE

## 2024-03-07 NOTE — TELEPHONE ENCOUNTER
Lov 12/4/23  
She needs some form of appt for any new medications to be Rx  Dr. BOTELLO  
Detail Level: Detailed

## 2024-03-11 ENCOUNTER — LAB VISIT (OUTPATIENT)
Dept: LAB | Facility: HOSPITAL | Age: 64
End: 2024-03-11
Attending: INTERNAL MEDICINE
Payer: MEDICARE

## 2024-03-11 ENCOUNTER — TELEPHONE (OUTPATIENT)
Dept: PRIMARY CARE CLINIC | Facility: CLINIC | Age: 64
End: 2024-03-11
Payer: MEDICARE

## 2024-03-11 ENCOUNTER — OFFICE VISIT (OUTPATIENT)
Dept: PRIMARY CARE CLINIC | Facility: CLINIC | Age: 64
End: 2024-03-11
Payer: MEDICARE

## 2024-03-11 VITALS
WEIGHT: 164.44 LBS | OXYGEN SATURATION: 98 % | TEMPERATURE: 98 F | HEIGHT: 62 IN | SYSTOLIC BLOOD PRESSURE: 124 MMHG | DIASTOLIC BLOOD PRESSURE: 82 MMHG | BODY MASS INDEX: 30.26 KG/M2 | HEART RATE: 75 BPM

## 2024-03-11 DIAGNOSIS — G43.809 OTHER MIGRAINE WITHOUT STATUS MIGRAINOSUS, NOT INTRACTABLE: ICD-10-CM

## 2024-03-11 DIAGNOSIS — G43.809 OTHER MIGRAINE WITHOUT STATUS MIGRAINOSUS, NOT INTRACTABLE: Primary | ICD-10-CM

## 2024-03-11 DIAGNOSIS — R41.3 MEMORY IMPAIRMENT: ICD-10-CM

## 2024-03-11 DIAGNOSIS — R07.89 ATYPICAL CHEST PAIN: ICD-10-CM

## 2024-03-11 LAB
CREAT SERPL-MCNC: 0.8 MG/DL (ref 0.5–1.4)
EST. GFR  (NO RACE VARIABLE): >60 ML/MIN/1.73 M^2

## 2024-03-11 PROCEDURE — 36415 COLL VENOUS BLD VENIPUNCTURE: CPT | Mod: PN | Performed by: INTERNAL MEDICINE

## 2024-03-11 PROCEDURE — 99214 OFFICE O/P EST MOD 30 MIN: CPT | Mod: PBBFAC,PN | Performed by: INTERNAL MEDICINE

## 2024-03-11 PROCEDURE — 99999 PR PBB SHADOW E&M-EST. PATIENT-LVL IV: CPT | Mod: PBBFAC,,, | Performed by: INTERNAL MEDICINE

## 2024-03-11 PROCEDURE — 82565 ASSAY OF CREATININE: CPT | Performed by: INTERNAL MEDICINE

## 2024-03-11 PROCEDURE — 99214 OFFICE O/P EST MOD 30 MIN: CPT | Mod: S$PBB,,, | Performed by: INTERNAL MEDICINE

## 2024-03-11 PROCEDURE — 96372 THER/PROPH/DIAG INJ SC/IM: CPT | Mod: PBBFAC,PN

## 2024-03-11 PROCEDURE — 99999PBSHW PR PBB SHADOW TECHNICAL ONLY FILED TO HB: Mod: PBBFAC,,,

## 2024-03-11 RX ORDER — KETOROLAC TROMETHAMINE 30 MG/ML
30 INJECTION, SOLUTION INTRAMUSCULAR; INTRAVENOUS ONCE
Status: DISCONTINUED | OUTPATIENT
Start: 2024-03-11 | End: 2024-03-11

## 2024-03-11 RX ORDER — DIAZEPAM 5 MG/1
TABLET ORAL
Qty: 1 TABLET | Refills: 0 | Status: CANCELLED | OUTPATIENT
Start: 2024-03-11

## 2024-03-11 RX ORDER — KETOROLAC TROMETHAMINE 30 MG/ML
15 INJECTION, SOLUTION INTRAMUSCULAR; INTRAVENOUS
Status: DISCONTINUED | OUTPATIENT
Start: 2024-03-11 | End: 2024-03-11

## 2024-03-11 RX ORDER — DIAZEPAM 5 MG/1
5 TABLET ORAL EVERY 6 HOURS PRN
Status: CANCELLED | OUTPATIENT
Start: 2024-03-11 | End: 2024-04-10

## 2024-03-11 RX ORDER — KETOROLAC TROMETHAMINE 30 MG/ML
60 INJECTION, SOLUTION INTRAMUSCULAR; INTRAVENOUS
Status: COMPLETED | OUTPATIENT
Start: 2024-03-11 | End: 2024-03-11

## 2024-03-11 RX ORDER — PHENOL/SODIUM PHENOLATE
AEROSOL, SPRAY (ML) MUCOUS MEMBRANE
Qty: 30 EACH | Refills: 0 | Status: SHIPPED | OUTPATIENT
Start: 2024-03-11

## 2024-03-11 RX ADMIN — KETOROLAC TROMETHAMINE 60 MG: 60 INJECTION, SOLUTION INTRAMUSCULAR at 02:03

## 2024-03-11 NOTE — PROGRESS NOTES
Two patient identifiers verified.  Allergies reviewed.  Ketorolac 30mg IM administered to Rt Gluteal per MD order.  Patient tolerated injection well; no redness, bleeding, or bruising noted to injection site.  Patient instructed to remain in clinic setting for 15 minutes.  Verbalizes understanding.

## 2024-03-11 NOTE — TELEPHONE ENCOUNTER
----- Message from Elida Parrish sent at 3/11/2024 11:30 AM CDT -----  Contact: [phone]  Patient is requesting a call back from the staff regarding:  Best call back number:[phone]  Thank you

## 2024-03-11 NOTE — PROGRESS NOTES
[facility] Note    Chief Complaint      Chief Complaint   Patient presents with    Headache       History of Present Illness      Mayelin Chowdary is a 64 y.o.   WF with Fatty liver, Hepatic cysts, Left parapelvic cyst, Obesity, and migraines presents to fu Migraines x 9 days. Referred by Kinza Lieberman. Last visit -12/4/23     +KEYUR - KEYUR was positive and Lupus panel was negative.  I saw her AntiSmooth muscle Antibody that Hepatology ordered was mildly positive and defer to them on that matter as this is usually a test for autoimmune hepatitis but her level was barely elevated so not sure this is clinically significant. She fu with Dr. Kelly.      Fatty Liver - Seen on MRI 10/16/18 at [facility]. Rec limit APAP/ETOH.  Rec diet and exercise for wt loss.  D/w pt potential for cirrhosis. CTA Abd- 1/12/23  - Hepatic steatosis.  Stable hepatic cysts and arterial enhancing lesion in the right hepatic lobe that could represent arterial portal shunting.  No suspicious lesions identified.Increased mesenteric stranding in the mid abdomen with multiple prominent mesenteric lymph nodes.  Findings likely represent mesenteric adenitis and are similar to prior MRI.   Mri abd - 12/2/22 -  + Fatty liver/hepatosteatosis, Multiple liver cysts largest of which is 2 cm. There is a small focus of arterial portal shunting enhancing in the inferior right hepatic lobe. Unsure of the cause of this finding possible due to one of the cysts compressing things? Multiple parapelvic renal cysts, primarily in left kidney.I referred her to Hepatology to further investigate.  Fibrosan - 1/12/23- 0-2; S - 3.  Fu by hepatology.  FIB-4 Calculation: 1.03 at 3/11/2024  1:12 PM   FIB-4 below 1.30 is considered as low-risk for advanced fibrosis  FIB-4 over 2.67 is considered as high-risk for advanced fibrosis  FIB-4 values between 1.30 and 2.67 are considered as intermediate-risk of advanced fibrosis for ages 36-64.   For ages > 64 the cut-off  for low-risk goes to < 2.    H/o Pruritis - Resolved. Rx Hydroxyzine to take every 8 hrs as needed for itching and also rec she take Famotidine  (Pepcid OTC) 20 mg once-twice daily - this is an acid reduced but helps with hives as it is a different type of antihistamine.  This helps when she takes it which is sparingly.  She fu with Derm, Dr. [facility], and A/I, Dr. Santana. Allergy testing was neg. per pt. She reports itching after taking Tylenol so she avoids it.      Hepatic Cysts - Seen on MRI 10/16/18 at [facility]. Fu by Hepatology.     Left parapelvic renal cyst - seen on MRI 10/16/18 at [facility].  Multiple parapelvic renal cysts, primarily in left kidney.     Overweight - BMI - 30.08-  Pt off Phentermine.  She walks most days and plans to start a low carb diet.      HLD - Controlled on Crestor to 40 mg daily. Continue a low cholesterol diet and exercise. Pt can visit the American heart association website at heart.org for further info on a low cholesterol diet.        Migraine - HA-  Imitrex and Excedrin not presently helping. She cut back on dairy.  Had Migraines in her 30's. Tylenol, Excedrine migraine, and ibuprofen no help. No phonophobia. No aura.  Sometimes wakes up with the HA.  No worse with bending coughing.  No assoc N/V.  Fioricet no help.  She thinks maxalt helped when she was younger. Pt off amitriptyline 50 mcg/d since 4 wks ago because it did not help with sleep.  Ha have worsened since 9 days ago- constant.  She stayed in bed the 1st 4 days. 10/10 in severity. +dizziness- comes and goes and is brief. ?Imbalance. No syncope or presyncope. 3-4/10 in severity. Last took Imitrex 7 days ago. Has been taking Excedrine kelle past several days. They wake her. It was the worst HA of her life over the weekend. No worse with bending, coughing, Sneezing. Ice helps a little.  Has appt with Neuro, Dr. Vitale, 4/17/23.       Anxiety -  She has always had flight anxiety. She has taken amitriptyline in past but was not  "for migraines. "I've got a lot of anxiety".  has been ill.  Pt not on Venlafaxine ER.   She takes alprazolam prn sparingly.  Cont current for now. Pt prefers prn medication.      Depression - Pt scored 4 on PHQ2 and 13 on PHQ 9 - c/w moderate depression.Pt not on Venlafaxine ER.   Doing well.  had to have toe amputated due to melanoma. He is being tx at  [facility]       Hypothyroidism  -TSH controlled on Levothyroxine to 137 mcg daily. Pt taking this medication by itself at least 30 minutes prior to any other meds or food.  She fu with Dr. Steffanie Clay.   Thyroid U/S - 1/31/22 -  thyroid Ultrasound. Heterogenous thyroid parenchyma without discrete nodule, similar to prior, and may reflect sequela of chronic/prior thyroiditis.     Carpal tunnel - Numbness tyrel hands - for the past couple mos.  Worse at night. She saw Dr. Kelly for this. She was told to sleep with wrist splints tyrel which helps but even driving or brushing her hair the hands go numb.  Steroid inj helped in past. Doing well s/p recent injections. Could consider EMG in future and referral back to Dr. Monzon in future if needed.      Insomnia - Pt preferred to inc the amitriptyline rather than add Trazodone. No snoring. No longer on amitriptyline - was not helping.      Memory issues - Pt forgot to put coffee pot under the coffee machine and coffee went everywhere. She called her grandkids her nieces and she doesn't have any nieces. Her  and daughter have noticed. Of note, she has not been sleeping well. She has been under inc stress. She and  have been going back and forth to [facility] and recently spent 3 mos there.   Consider Neuropsych eval in future. Vitamin B12 level which was normal. Thiamine and RPR were both normal. Check MRI Brain.      HCM - Flu - 12/4/23; Tdap - 10/5/15;  PCV 20 -  7/31/23;  Shingrix - none - defers;  COVID -19 vaccine (Moderna) #1 -2/3/21 #2 - 3/3/21; # 3 - 11/29/21; # 4 7/26/22; # 5 declines; MGM - 2/7/24- " repeat 1 yr;  DEXA - 4/4/23 - wnl; PAP - s/p hys in 20's and unilateral salpingoophorectomy; Hep C Screen - neg -1/12/21;  HIV Screen -  neg -1/12/21 ; C-scope -6/1/22 - hemorrhoids - repeat 10 yrs;  Prev PCP - Dr. Mcdermott;  Ob/GYN - Dr. Sotomayor; Rheum - Dr. Kelly; Prev GI - Dr. Hutson; ENT - Dr. Toscano; Hand Sx - Dr. Monzon;  Well visit - 7/14/22 - with Dr. Gaming    Patient Care Team:  Lizzie Huff MD as PCP - General (Internal Medicine)  Babs Greenberg MA as Care Coordinator  Ab Valenzuela Jr., MD as Consulting Physician (Gastroenterology)     Health Maintenance:  Immunization History   Administered Date(s) Administered    COVID-19 MRNA, LN-S PF (MODERNA HALF 0.25 ML DOSE) 11/29/2021, 07/26/2022    COVID-19 Vaccine 02/03/2021, 03/03/2021    Influenza 11/07/2020    Influenza (FLUAD) - Quadrivalent - Adjuvanted - PF *Preferred* (65+) 12/04/2023    Influenza - Quadrivalent 10/29/2019    Influenza - Quadrivalent - MDCK - PF 12/28/2021    Influenza - Quadrivalent - PF *Preferred* (6 months and older) 11/08/2018, 10/31/2022    Influenza - Trivalent (ADULT) 10/17/2008    Influenza - Trivalent - PF (ADULT) 01/11/2013, 10/05/2015    Pneumococcal Conjugate - 20 Valent 07/31/2023    Tdap 10/05/2015      Health Maintenance   Topic Date Due    Shingles Vaccine (1 of 2) Never done    Mammogram  02/07/2025    TETANUS VACCINE  10/05/2025    DEXA Scan  04/04/2027    Lipid Panel  11/30/2028    Colorectal Cancer Screening  06/01/2032    Hepatitis C Screening  Completed        Past Medical History:  Past Medical History:   Diagnosis Date    Abdominal pain 2/4/2023    Anxiety     Gallstones     Hepatic cyst 1/6/2021    High cholesterol     Hypothyroid     Migraines     Pruritus 1/11/2023    Renal cyst, left 1/6/2021    Stress incontinence (female) (male) 12/13/2012    Note: Unchanged       Past Surgical History:   has a past surgical history that includes Tubal ligation; Shoulder surgery (Right);  Appendectomy (1976); Oophorectomy (1987); LUMPECTOMY,BREAST,WITH RADIOACTIVE SEED LOCALIZATION AND SENTINEL LYMPH NODE BIOPSY (2017); Cholecystectomy (10/01/2020); Partial hysterectomy (1986); bladder lift (2014); and Breast biopsy (Left, 2018).    Family History:  family history includes Alzheimer's disease in her mother; Breast cancer in her maternal aunt and maternal aunt; Diabetes in her father; Lung cancer in her father; Pacemaker/defibrilator in her mother.     Social History:  Social History     Tobacco Use    Smoking status: Never    Smokeless tobacco: Never   Substance Use Topics    Alcohol use: Not Currently     Comment: social    Drug use: Never       Review of Systems   Constitutional:  Positive for diaphoresis and fatigue. Negative for chills and fever.        Not new.     HENT:  Negative for nasal congestion and rhinorrhea.    Eyes:  Negative for visual disturbance.   Respiratory:  Negative for cough, chest tightness and shortness of breath.    Cardiovascular:  Positive for chest pain. Negative for palpitations.        CP off and on - 3 mos. Midsternal. Belching helps. Not a tightness or heaviness. Lasts a few minutes and self resolves. No exac factors. No assoc palpitations, SOB, Diaphoresis. Does not occur when she walks. She walks 30 min. X 3/wk. Can be worse after eating at times.    Gastrointestinal:  Positive for abdominal distention. Negative for abdominal pain, change in bowel habit, constipation, diarrhea, nausea, vomiting and reflux.        No change in stools. She avoids gluten, milk.  Rec reflux prec.    Musculoskeletal:  Negative for arthralgias, leg pain and neck pain.   Neurological:  Positive for dizziness and headaches. Negative for vertigo and syncope.        Diffuse squeezing/tightness in head. No aura. +Photophobia. + Nausea. No emesis.         Medications:    Current Outpatient Medications:     ALPRAZolam (XANAX) 0.25 MG tablet, TAKE 1 TABLET BY MOUTH DAILY AS NEEDED FOR ANXIETY,  "Disp: 30 tablet, Rfl: 0    amitriptyline (ELAVIL) 50 MG tablet, Take 1 tablet (50 mg total) by mouth nightly as needed for Insomnia., Disp: 30 tablet, Rfl: 5    chlordiazepoxide-clidinium 5-2.5 mg (LIBRAX) 5-2.5 mg Cap, Take 1 capsule by mouth 2 (two) times daily as needed., Disp: , Rfl:     hydrOXYzine HCL (ATARAX) 25 MG tablet, Take 1 tablet (25 mg total) by mouth 3 (three) times daily as needed for Itching., Disp: 90 tablet, Rfl: 0    ipratropium (ATROVENT) 42 mcg (0.06 %) nasal spray, 2 sprays by Each Nostril route 2 (two) times daily., Disp: 15 mL, Rfl: 3    levothyroxine (SYNTHROID) 137 MCG Tab tablet, TAKE 1 TABLET BY MOUTH BEFORE BREAKFAST, Disp: 90 tablet, Rfl: 3    promethazine (PHENERGAN) 25 MG tablet, Take 1 tablet (25 mg total) by mouth every 8 (eight) hours as needed., Disp: 20 tablet, Rfl: 0    rosuvastatin (CRESTOR) 40 MG Tab, Take 1 tablet (40 mg total) by mouth once daily., Disp: 90 tablet, Rfl: 1    sumatriptan (IMITREX) 50 MG tablet, 1 po at onset of migraine and can repeat in 2 hrs - max of 2 in 24 hrs, Disp: 12 tablet, Rfl: 0    omeprazole 20 mg TbEC, 1 po daily x 2 wks, Disp: 30 each, Rfl: 0  No current facility-administered medications for this visit.     Allergies:  Review of patient's allergies indicates:   Allergen Reactions    Bactrim [sulfamethoxazole-trimethoprim] Nausea Only       Physical Exam      Vital Signs  Temp: 97.7 °F (36.5 °C)  Temp Source: Oral  Pulse: 75  SpO2: 98 %  BP: 124/82  BP Location: Right arm  Patient Position: Sitting  Pain Score:   4  Pain Loc: Head  Height and Weight  Height: 5' 2" (157.5 cm)  Weight: 74.6 kg (164 lb 7.4 oz)  BSA (Calculated - sq m): 1.81 sq meters  BMI (Calculated): 30.1  Weight in (lb) to have BMI = 25: 136.4      Patient Position: Sitting      Physical Exam  HENT:      Head: Normocephalic and atraumatic.      Right Ear: Tympanic membrane normal.      Left Ear: Tympanic membrane normal.      Mouth/Throat:      Mouth: Mucous membranes are moist. "   Eyes:      Extraocular Movements: Extraocular movements intact.      Conjunctiva/sclera: Conjunctivae normal.      Pupils: Pupils are equal, round, and reactive to light.   Cardiovascular:      Rate and Rhythm: Normal rate and regular rhythm.      Pulses: Normal pulses.      Heart sounds: No murmur heard.  Pulmonary:      Effort: No respiratory distress.      Breath sounds: Normal breath sounds.   Neurological:      General: No focal deficit present.      Mental Status: She is oriented to person, place, and time.      Comments: Finger to nose - wnl;  LUANN - wnl;  Patellar DTR's - 2+; Facies symmetric; Gaston shoulder shrug symmetric; Facial sensation intact; strength 5/5. No pronator drift;  Heel to shin wnl. Gag reflex - intact.  Tongue and uvula midline. Gait - wnl.    Psychiatric:         Mood and Affect: Mood normal.         Behavior: Behavior normal.          Laboratory:  CBC:  Recent Labs   Lab 01/06/22  0937 01/11/23  0846   WBC 8.17 7.15   RBC 4.94 4.91   Hemoglobin 14.6 14.1   Hematocrit 43.7 43.9   Platelets 276 262   MCV 89 89   MCH 29.6 28.7   MCHC 33.4 32.1       CMP:  Recent Labs   Lab 01/06/22  0938 01/11/23  0846 11/30/23  1112 03/11/24  1426   Glucose 107 111 H 98  --    Calcium 9.5 9.5 9.4  --    Albumin 4.0 4.2 4.3  --    Total Protein 7.1 7.2 7.2  --    Sodium 141 141 142  --    Potassium 4.4 3.7 4.3  --    CO2 23 26 29  --    Chloride 106 105 104  --    BUN 13 14 15  --    Creatinine 0.7 0.8  0.8 0.8 0.8   Alkaline Phosphatase 70 77 70  --    ALT 29 25 24  --    AST 19 21 20  --    Total Bilirubin 0.4 0.7 0.5  --        LIPIDS:  Recent Labs   Lab 07/13/22  1000 11/03/22  1028 01/11/23  0846 11/30/23  1112   TSH  --  5.678 H 0.022 L 3.686   HDL 53 44  --  53   Cholesterol 271 H 283 H  --  193   Triglycerides 278 H 206 H  --  96   LDL Cholesterol 162.4 H 197.8 H  --  120.8   HDL/Cholesterol Ratio 19.6 L 15.5 L  --  27.5   Non-HDL Cholesterol 218 239  --  140   Total Cholesterol/HDL Ratio 5.1 H 6.4  H  --  3.6       TSH:  Recent Labs   Lab 11/03/22  1028 01/11/23  0846 11/30/23  1112   TSH 5.678 H 0.022 L 3.686       A1C:  Recent Labs   Lab 11/03/22  1028   Hemoglobin A1C 5.5       Other:   Recent Labs   Lab 12/04/23  0837   Vitamin B-12 673           Assessment/Plan     Mayelin Chowdary is a 64 y.o.female with:    Other migraine without status migrainosus, not intractable  -     MRI Brain With Contrast; Future; Expected date: 03/11/2024  -     Creatinine, serum; Future; Expected date: 03/11/2024  -     ketorolac injection 30 mg  - Admin Toradol x 1. Check MRI. Can cont prn imitrex.  Pt has upcoming appt with Neuro.  If HA persists she will alert MD and could consider steroid taper.     Memory impairment  -     MRI Brain With Contrast; Future; Expected date: 03/11/2024  - Check MRI Brain.     Atypical chest pain   -     omeprazole 20 mg TbEC; 1 po daily x 2 wks  Dispense: 30 each; Refill: 0  - Rec Reflux prec.  Rec Omeprazole x 2 wks. Alert MD for any worsening Sx's.    Chronic conditions status updated as per HPI.  Other than changes above, cont current medications and maintain follow up with specialists.  Follow up in about 6 months (around 9/11/2024) for fu chronic issues or sooner if needed.      Lizzie Huff MD  [facility]

## 2024-03-12 RX ORDER — DIAZEPAM 5 MG/1
TABLET ORAL
Qty: 1 TABLET | Refills: 0 | Status: SHIPPED | OUTPATIENT
Start: 2024-03-12 | End: 2024-04-24

## 2024-03-15 ENCOUNTER — PATIENT MESSAGE (OUTPATIENT)
Dept: PRIMARY CARE CLINIC | Facility: CLINIC | Age: 64
End: 2024-03-15
Payer: MEDICARE

## 2024-03-15 NOTE — TELEPHONE ENCOUNTER
"Sorry for the confusion as I know we talked about a lot and I was deciding btwn the Toradol and the steroids but I ultimately  had decidedat the visit admin  the toradol instead.   "If STARK persists she will alert MD and could consider steroid taper." IS she still having the STARK?  Dr. BOTELLO"

## 2024-03-22 ENCOUNTER — HOSPITAL ENCOUNTER (OUTPATIENT)
Dept: RADIOLOGY | Facility: HOSPITAL | Age: 64
Discharge: HOME | End: 2024-03-22
Attending: INTERNAL MEDICINE
Payer: MEDICARE

## 2024-03-22 DIAGNOSIS — R41.3 MEMORY IMPAIRMENT: ICD-10-CM

## 2024-03-22 DIAGNOSIS — G43.809 OTHER MIGRAINE WITHOUT STATUS MIGRAINOSUS, NOT INTRACTABLE: ICD-10-CM

## 2024-03-22 PROCEDURE — 70552 MRI BRAIN STEM W/DYE: CPT | Mod: 26,,, | Performed by: RADIOLOGY

## 2024-03-22 PROCEDURE — 25500020 PHARM REV CODE 255: Performed by: INTERNAL MEDICINE

## 2024-03-22 PROCEDURE — A9585 GADOBUTROL INJECTION: HCPCS | Performed by: INTERNAL MEDICINE

## 2024-03-22 PROCEDURE — 70552 MRI BRAIN STEM W/DYE: CPT | Mod: TC

## 2024-03-22 RX ORDER — GADOBUTROL 604.72 MG/ML
7 INJECTION INTRAVENOUS
Status: COMPLETED | OUTPATIENT
Start: 2024-03-22 | End: 2024-03-22

## 2024-03-22 RX ADMIN — GADOBUTROL 7 ML: 604.72 INJECTION INTRAVENOUS at 06:03

## 2024-03-28 ENCOUNTER — PATIENT MESSAGE (OUTPATIENT)
Dept: PRIMARY CARE CLINIC | Facility: CLINIC | Age: 64
End: 2024-03-28
Payer: MEDICARE

## 2024-03-28 DIAGNOSIS — G43.809 OTHER MIGRAINE WITHOUT STATUS MIGRAINOSUS, NOT INTRACTABLE: Primary | ICD-10-CM

## 2024-03-28 NOTE — PROGRESS NOTES
I sent pt a my chart message -  I reviewed your MRI Brain. There was No acute intracranial process.  Mild bilateral ethmoid and bilateral maxillary sinus disease.  Have you been having any sinus issues?  Dr. BOTELLO

## 2024-03-28 NOTE — TELEPHONE ENCOUNTER
Let's see how the steroids and Antibiotics help as the Antibiotics should hopefully help with a sinus infection. If she is already on steroids I would not Rx more. How often are her HA now? How severe are they on scale of 1-10?    Did the Toradol injection make it go away and did it return or did it never go away?  Is imitrex helping at all?  Dr. BOTELLO

## 2024-04-02 RX ORDER — ELETRIPTAN HYDROBROMIDE 40 MG/1
TABLET, FILM COATED ORAL
Qty: 12 TABLET | Refills: 0 | Status: SHIPPED | OUTPATIENT
Start: 2024-04-02

## 2024-04-02 NOTE — TELEPHONE ENCOUNTER
Ok.   Will try to change to an alternative to Imitrex called relpax.  If still no help she has an appt with Dr. Vitale and he may need to try something else.   Dr. BOTELLO

## 2024-04-18 ENCOUNTER — TELEPHONE (OUTPATIENT)
Dept: PRIMARY CARE CLINIC | Facility: CLINIC | Age: 64
End: 2024-04-18
Payer: MEDICARE

## 2024-04-18 ENCOUNTER — PATIENT MESSAGE (OUTPATIENT)
Dept: PRIMARY CARE CLINIC | Facility: CLINIC | Age: 64
End: 2024-04-18
Payer: MEDICARE

## 2024-04-18 NOTE — TELEPHONE ENCOUNTER
We need to reschedule her establish care visit appointment  If she needs any medication refills are anything prior to her establish visit, have her make a video visit with me so we can get those things taken care of 1st

## 2024-04-24 ENCOUNTER — OFFICE VISIT (OUTPATIENT)
Dept: OBSTETRICS AND GYNECOLOGY | Facility: CLINIC | Age: 64
End: 2024-04-24
Attending: OBSTETRICS & GYNECOLOGY
Payer: MEDICARE

## 2024-04-24 VITALS — HEIGHT: 62 IN | BODY MASS INDEX: 30.49 KG/M2 | WEIGHT: 165.69 LBS

## 2024-04-24 DIAGNOSIS — Z01.419 ENCOUNTER FOR GYNECOLOGICAL EXAMINATION (GENERAL) (ROUTINE) WITHOUT ABNORMAL FINDINGS: ICD-10-CM

## 2024-04-24 DIAGNOSIS — R32 URINARY INCONTINENCE, UNSPECIFIED TYPE: Primary | ICD-10-CM

## 2024-04-24 LAB
BILIRUB UR QL STRIP: NEGATIVE
CLARITY UR REFRACT.AUTO: CLEAR
COLOR UR AUTO: YELLOW
GLUCOSE UR QL STRIP: NEGATIVE
HGB UR QL STRIP: NEGATIVE
KETONES UR QL STRIP: NEGATIVE
LEUKOCYTE ESTERASE UR QL STRIP: NEGATIVE
NITRITE UR QL STRIP: NEGATIVE
PH UR STRIP: 6 [PH] (ref 5–8)
PROT UR QL STRIP: NEGATIVE
SP GR UR STRIP: 1.01 (ref 1–1.03)
URN SPEC COLLECT METH UR: NORMAL

## 2024-04-24 PROCEDURE — 87186 SC STD MICRODIL/AGAR DIL: CPT | Performed by: OBSTETRICS & GYNECOLOGY

## 2024-04-24 PROCEDURE — 87086 URINE CULTURE/COLONY COUNT: CPT | Performed by: OBSTETRICS & GYNECOLOGY

## 2024-04-24 PROCEDURE — G0101 CA SCREEN;PELVIC/BREAST EXAM: HCPCS | Mod: S$PBB,,, | Performed by: OBSTETRICS & GYNECOLOGY

## 2024-04-24 PROCEDURE — 99212 OFFICE O/P EST SF 10 MIN: CPT | Mod: PBBFAC | Performed by: OBSTETRICS & GYNECOLOGY

## 2024-04-24 PROCEDURE — 87088 URINE BACTERIA CULTURE: CPT | Performed by: OBSTETRICS & GYNECOLOGY

## 2024-04-24 PROCEDURE — 99999 PR PBB SHADOW E&M-EST. PATIENT-LVL II: CPT | Mod: PBBFAC,,, | Performed by: OBSTETRICS & GYNECOLOGY

## 2024-04-24 PROCEDURE — 81003 URINALYSIS AUTO W/O SCOPE: CPT | Performed by: OBSTETRICS & GYNECOLOGY

## 2024-04-24 PROCEDURE — 87077 CULTURE AEROBIC IDENTIFY: CPT | Performed by: OBSTETRICS & GYNECOLOGY

## 2024-04-24 PROCEDURE — G0101 CA SCREEN;PELVIC/BREAST EXAM: HCPCS | Mod: PBBFAC | Performed by: OBSTETRICS & GYNECOLOGY

## 2024-04-24 NOTE — PROGRESS NOTES
Subjective:       Patient ID: Mayelin Chowdary is a 64 y.o. female.    Chief Complaint:  Annual Exam (C/o issues with bladder lift)        History of Present Illness  Mayelin Chowdary is a 64 y.o. female  who presents for annual. Overall she is doing well. Off of Premarin because Medicare would not pay for it. For about the past 6 months she has had occasional episodes where she leaks urine, worse at night when she gets up to urinate.  Not with cough, sneeze or laugh. I did bladder sling on her years ago. No hematuria. The only other recent change  is new onset headaches. One had her in bed for 9 days. Had workup with neuro including imaging that was normal. Will start with UA and culture and go from there for the occasional incontinence.     No LMP recorded (lmp unknown). Patient has had a hysterectomy.   Date of Last Pap: No result found    Review of Systems  Review of Systems   Constitutional:  Negative for chills and fever.        Objective:   Physical Exam:   Constitutional: She is oriented to person, place, and time. Vital signs are normal. She appears well-developed and well-nourished. No distress.        Pulmonary/Chest: She exhibits no mass. Right breast exhibits no mass, no nipple discharge, no skin change, no tenderness, no bleeding and no swelling. Left breast exhibits no mass, no nipple discharge, no skin change, no tenderness, no bleeding and no swelling. Breasts are symmetrical.        Abdominal: Soft. Bowel sounds are normal. She exhibits no distension and no mass. There is no abdominal tenderness. There is no rebound.     Genitourinary:    Vagina normal.   There is no rash, tenderness, lesion or injury on the right labia. There is no rash, tenderness, lesion or injury on the left labia. Right adnexum displays no mass, no tenderness and no fullness. Left adnexum displays no mass, no tenderness and no fullness. No erythema, vaginal discharge, tenderness, rectocele, cystocele or prolapse of  vaginal walls in the vagina. Vaginal atrophy noted. Cervix is absent.Uterus is absent.           Musculoskeletal: Normal range of motion and moves all extremeties.      Lymphadenopathy:        Right: No supraclavicular adenopathy present.        Left: No supraclavicular adenopathy present.    Neurological: She is alert and oriented to person, place, and time.    Skin: Skin is warm and dry.    Psychiatric: She has a normal mood and affect. Her behavior is normal. Judgment normal.        Assessment/ Plan:     1. Urinary incontinence, unspecified type  Urinalysis    CULTURE, URINE      2. Encounter for gynecological examination (general) (routine) without abnormal findings          If urine sample is normal consider adding estrace cream now that she is off HRT  No follow-ups on file.    As of April 1, 2021, the Cures Act has been passed nationally. This new law requires that all doctors progress notes, lab results, pathology reports and radiology reports be released IMMEDIATELY to the patient in the patient portal. That means that the results are released to you at the EXACT same time they are released to me. Therefore, with all of the patients that I have I am not able to reply to each patient exactly when the results come in. So there will be a delay from when you see the results to when I see them and have time to come up with a response to send you. Also I only see these results when I am on the computer at work. So if the results come in over the weekend or after 5 pm of a work day, I will not see them until the next business day. As you can tell, this is a challenge as a physician to give every patient the quick response they hope for and deserve. So please be patient! Thanks for understanding, Dr. Sotomayor

## 2024-04-27 LAB — BACTERIA UR CULT: ABNORMAL

## 2024-04-28 ENCOUNTER — PATIENT MESSAGE (OUTPATIENT)
Dept: OBSTETRICS AND GYNECOLOGY | Facility: CLINIC | Age: 64
End: 2024-04-28
Payer: MEDICARE

## 2024-04-28 DIAGNOSIS — N30.00 ACUTE CYSTITIS WITHOUT HEMATURIA: Primary | ICD-10-CM

## 2024-04-28 RX ORDER — CIPROFLOXACIN 500 MG/1
500 TABLET ORAL EVERY 12 HOURS
Qty: 14 TABLET | Refills: 0 | Status: SHIPPED | OUTPATIENT
Start: 2024-04-28 | End: 2024-05-05

## 2024-05-16 ENCOUNTER — HOSPITAL ENCOUNTER (EMERGENCY)
Facility: HOSPITAL | Age: 64
Discharge: HOME | End: 2024-05-16
Attending: EMERGENCY MEDICINE
Payer: MEDICARE

## 2024-05-16 VITALS
OXYGEN SATURATION: 100 % | RESPIRATION RATE: 15 BRPM | TEMPERATURE: 98 F | HEART RATE: 65 BPM | SYSTOLIC BLOOD PRESSURE: 122 MMHG | DIASTOLIC BLOOD PRESSURE: 77 MMHG | HEIGHT: 62 IN | WEIGHT: 165 LBS | BODY MASS INDEX: 30.36 KG/M2

## 2024-05-16 DIAGNOSIS — R51.9 NONINTRACTABLE EPISODIC HEADACHE, UNSPECIFIED HEADACHE TYPE: Primary | ICD-10-CM

## 2024-05-16 PROCEDURE — 96361 HYDRATE IV INFUSION ADD-ON: CPT

## 2024-05-16 PROCEDURE — 96375 TX/PRO/DX INJ NEW DRUG ADDON: CPT

## 2024-05-16 PROCEDURE — 63600175 PHARM REV CODE 636 W HCPCS: Performed by: EMERGENCY MEDICINE

## 2024-05-16 PROCEDURE — 25000003 PHARM REV CODE 250: Performed by: EMERGENCY MEDICINE

## 2024-05-16 PROCEDURE — 99284 EMERGENCY DEPT VISIT MOD MDM: CPT

## 2024-05-16 PROCEDURE — 96374 THER/PROPH/DIAG INJ IV PUSH: CPT

## 2024-05-16 RX ORDER — ACETAMINOPHEN 500 MG
1000 TABLET ORAL
Status: COMPLETED | OUTPATIENT
Start: 2024-05-16 | End: 2024-05-16

## 2024-05-16 RX ORDER — DIPHENHYDRAMINE HYDROCHLORIDE 50 MG/ML
25 INJECTION INTRAMUSCULAR; INTRAVENOUS
Status: COMPLETED | OUTPATIENT
Start: 2024-05-16 | End: 2024-05-16

## 2024-05-16 RX ORDER — METOCLOPRAMIDE HYDROCHLORIDE 5 MG/ML
10 INJECTION INTRAMUSCULAR; INTRAVENOUS
Status: COMPLETED | OUTPATIENT
Start: 2024-05-16 | End: 2024-05-16

## 2024-05-16 RX ORDER — DEXAMETHASONE SODIUM PHOSPHATE 4 MG/ML
10 INJECTION, SOLUTION INTRA-ARTICULAR; INTRALESIONAL; INTRAMUSCULAR; INTRAVENOUS; SOFT TISSUE
Status: COMPLETED | OUTPATIENT
Start: 2024-05-16 | End: 2024-05-16

## 2024-05-16 RX ORDER — KETOROLAC TROMETHAMINE 30 MG/ML
10 INJECTION, SOLUTION INTRAMUSCULAR; INTRAVENOUS
Status: COMPLETED | OUTPATIENT
Start: 2024-05-16 | End: 2024-05-16

## 2024-05-16 RX ADMIN — DIPHENHYDRAMINE HYDROCHLORIDE 25 MG: 50 INJECTION, SOLUTION INTRAMUSCULAR; INTRAVENOUS at 07:05

## 2024-05-16 RX ADMIN — ACETAMINOPHEN 1000 MG: 500 TABLET ORAL at 07:05

## 2024-05-16 RX ADMIN — KETOROLAC TROMETHAMINE 10 MG: 30 INJECTION, SOLUTION INTRAMUSCULAR at 07:05

## 2024-05-16 RX ADMIN — SODIUM CHLORIDE 1000 ML: 9 INJECTION, SOLUTION INTRAVENOUS at 07:05

## 2024-05-16 RX ADMIN — METOCLOPRAMIDE 10 MG: 5 INJECTION, SOLUTION INTRAMUSCULAR; INTRAVENOUS at 07:05

## 2024-05-16 RX ADMIN — DEXAMETHASONE SODIUM PHOSPHATE 10 MG: 4 INJECTION INTRA-ARTICULAR; INTRALESIONAL; INTRAMUSCULAR; INTRAVENOUS; SOFT TISSUE at 09:05

## 2024-05-16 NOTE — ED PROVIDER NOTES
Encounter Date: 5/16/2024       History     Chief Complaint   Patient presents with    Headache     Pt complaining of migraine x3 days with light sensitivity, denies blurred vision and vomiting. Pt has history of migraines     64-year-old female, with history of migraines, anxiety, hypothyroidism, presents to the ED for headache.  Patient reports to have insisted onset of headache for the last 3 days, describes the pain as aching that wrap around her head, associated with blurry vision, photophobia. Denies any nausea, vomiting, numbness, tingling, or weakness.  Denies any recent head injury, not on blood thinner.  Denies any recent illness, no fever, chill. Patient stated this is similar to her migraine in the past, has been using her sumatriptan at home without help. Her neurologist called for Nurtec but she says she can't afford it.         Review of patient's allergies indicates:  No Known Allergies  Past Medical History:   Diagnosis Date    Abdominal pain 2/4/2023    Anxiety     Gallstones     Hepatic cyst 1/6/2021    High cholesterol     Hypothyroid     Migraines     Pruritus 1/11/2023    Renal cyst, left 1/6/2021    Stress incontinence (female) (male) 12/13/2012    Note: Unchanged     Past Surgical History:   Procedure Laterality Date    APPENDECTOMY  1976    bladder lift  2014    BREAST BIOPSY Left 2018    benign    CHOLECYSTECTOMY  10/01/2020    LUMPECTOMY,BREAST, WITH RADIOACTIVE SEED LOCALIZATION AND SENTINEL LYMPH NODE BIOPSY  2017    OOPHORECTOMY  1987    PARTIAL HYSTERECTOMY  1986    SHOULDER SURGERY Right     TUBAL LIGATION       Family History   Problem Relation Name Age of Onset    Diabetes Father      Lung cancer Father      Pacemaker/defibrilator Mother      Alzheimer's disease Mother      Breast cancer Maternal Aunt      Breast cancer Maternal Aunt       Social History     Tobacco Use    Smoking status: Never    Smokeless tobacco: Never   Substance Use Topics    Alcohol use: Not Currently      Comment: social    Drug use: Never     Review of Systems    Physical Exam     Initial Vitals [05/16/24 0629]   BP Pulse Resp Temp SpO2   135/85 68 18 97.9 °F (36.6 °C) 99 %      MAP       --         Physical Exam    Nursing note and vitals reviewed.  Constitutional: She appears well-developed. No distress.   HENT:   Head: Normocephalic and atraumatic.   Mouth/Throat: Oropharynx is clear and moist.   Eyes: Conjunctivae and EOM are normal.   Neck: No JVD present.   Normal range of motion.  Cardiovascular:  Normal rate, regular rhythm, normal heart sounds and intact distal pulses.           Pulmonary/Chest: Breath sounds normal. No respiratory distress.   Abdominal: Abdomen is soft. She exhibits no distension. There is no abdominal tenderness.   Musculoskeletal:         General: No tenderness or edema. Normal range of motion.      Cervical back: Normal range of motion.     Neurological: She is alert and oriented to person, place, and time. She has normal strength.   Skin: Skin is warm and dry. Capillary refill takes less than 2 seconds.         ED Course   Procedures  Labs Reviewed - No data to display       Imaging Results    None          Medications   sodium chloride 0.9% bolus 1,000 mL 1,000 mL (0 mLs Intravenous Stopped 5/16/24 0837)   ketorolac injection 9.999 mg (9.999 mg Intravenous Given 5/16/24 0732)   acetaminophen tablet 1,000 mg (1,000 mg Oral Given 5/16/24 0732)   metoclopramide injection 10 mg (10 mg Intravenous Given 5/16/24 0732)   diphenhydrAMINE injection 25 mg (25 mg Intravenous Given 5/16/24 0732)   dexAMETHasone injection 10 mg (10 mg Intravenous Given 5/16/24 0931)     Medical Decision Making  64-year-old female, with history of migraines, anxiety, hypothyroidism, presents to the ED for headache.  Patient is alert and oriented, hemodynamically stable, no focal neurological deficits.  Patient has no red flags such as sudden-onset headache, fever, or neurological deficits, reports similar to her  previous migraine, no advanced imaging indicated.  Will treat her headache with IV fluid, Toradol, Tylenol, and Phenergan.  Will give dexamethasone if her headache resolved with previous treatment.  On re-examination, patient reports that her headache improved, able to sleep.  Stable to discharge home.  No other questions.    Amount and/or Complexity of Data Reviewed  External Data Reviewed: notes.     Details: Neurology 4/17  Assessment/Plan:  Mayelin was seen today for headache.    Diagnoses and all orders for this visit:    Migraine without aura and without status migrainosus, not intractable  - rimegepant (NURTEC ODT) 75 mg TbDL tablet; Take 1 tablet by mouth every other day  - methylPREDNISolone (MEDROL, OSCAR,) 4 mg tablet; follow package directions    Start Nurtec daily p.r.n. pending prior auth. Medrol dose pack given in case she experiences another extended headache episode. Patient was provided with Nurtec samples.    MRI on March 2024  MRI BRAIN without and with CONTRAST     CLINICAL HISTORY:  Headache, new or worsening (Age >= 50y);  Other amnesia     TECHNIQUE:  Multiplanar MRI of the brain without and with contrast.  Dose given 7 cc Gadavist intravenous contrast.     COMPARISON:  None     FINDINGS:  The brain appears normally formed.  No midline shift or hydrocephalus.     No evidence of any focal mass or hemorrhage.     Normal vascular flow voids are noted.  Calvarium appears intact.  No evidence of infarction.     Mild bilateral ethmoid and bilateral maxillary sinus disease.     Brain parenchyma appears within normal limits.     Impression:     1. No acute intracranial process.  2. Mild paranasal sinus disease.    Risk  OTC drugs.  Prescription drug management.              Attending Attestation:   Physician Attestation Statement for Resident:  As the supervising MD   Physician Attestation Statement: I have personally seen and examined this patient.   I agree with the above history.  -:   As the  supervising MD I agree with the above PE.     As the supervising MD I agree with the above treatment, course, plan, and disposition.                Attending ED Notes:   STAFF ATTENDING PHYSICIAN NOTE:  I have individually/jointly evaluated Mayelin Chowdary and discussed their ED management with the resident physician. I have also reviewed their notes, assessments, and procedures documented.  I was present during all critical portions of any procedure(s) performed on Mayelin Chowdary.   ____________________  Antonio Dent MD, [facility]  Emergency Medicine Staff                               Clinical Impression:  Final diagnoses:  [R51.9] Nonintractable episodic headache, unspecified headache type (Primary)          ED Disposition Condition    Discharge Stable          ED Prescriptions    None       Follow-up Information       Follow up With Specialties Details Why Contact Info    Lizzie Huff MD Internal Medicine In 1 week  [address] [facility] [address]  [phone]      [facility] - Emergency Dept Emergency Medicine  As needed [address] [facility] [id number] [phone]             Saran Viramontes MD  Resident  05/16/24 1522       Thuan Dent MD  05/20/24 0651

## 2024-05-30 ENCOUNTER — PATIENT MESSAGE (OUTPATIENT)
Dept: OBSTETRICS AND GYNECOLOGY | Facility: CLINIC | Age: 64
End: 2024-05-30
Payer: MEDICARE

## 2024-05-30 DIAGNOSIS — R30.0 DYSURIA: Primary | ICD-10-CM

## 2024-05-30 RX ORDER — CIPROFLOXACIN 500 MG/1
500 TABLET ORAL EVERY 12 HOURS
Qty: 14 TABLET | Refills: 0 | Status: SHIPPED | OUTPATIENT
Start: 2024-05-30 | End: 2024-06-06

## 2024-05-30 NOTE — TELEPHONE ENCOUNTER
[place] s/s of a UTI again.  Felt better after taking Cipro in April.     Would you rec doing Cipro again or a different abx?     Allergies and pharmacy UTD

## 2024-06-23 ENCOUNTER — ON-DEMAND VIRTUAL (OUTPATIENT)
Dept: URGENT CARE | Facility: CLINIC | Age: 64
End: 2024-06-23
Payer: MEDICARE

## 2024-06-24 DIAGNOSIS — F41.8 SITUATIONAL ANXIETY: ICD-10-CM

## 2024-06-24 RX ORDER — ALPRAZOLAM 0.25 MG/1
TABLET ORAL
Qty: 30 TABLET | Refills: 0 | Status: SHIPPED | OUTPATIENT
Start: 2024-06-24

## 2024-06-24 NOTE — TELEPHONE ENCOUNTER
No care due was identified.  [facility] Embedded Care Due Messages. Reference number: [id number].   6/24/2024 2:01:27 PM CDT

## 2024-07-02 ENCOUNTER — PATIENT MESSAGE (OUTPATIENT)
Dept: PRIMARY CARE CLINIC | Facility: CLINIC | Age: 64
End: 2024-07-02
Payer: MEDICARE

## 2024-07-02 NOTE — TELEPHONE ENCOUNTER
Lov 3/11/24   Principal Discharge DX:	Fall, initial encounter  Instructions for follow-up, activity and diet:	1. Follow-up with your primary care doctor or medicine clinic at [phone] in 3-5 days   2. Return immediately for any worsening or concerning symptoms including dizziness, headache, nausea, vomiting, change in vision, numbness, weakness  Secondary Diagnosis:	Traumatic injury of head, initial encounter

## 2024-07-02 NOTE — TELEPHONE ENCOUNTER
She is ok to take valtrex and the ibuprofen 600-800 mg po Q 8 prn pain as needed - rec she take it with food to protect her from having GI issues. Technically she just has to wait until the rash has completely cleared but she can  delay vaccination for approximately one year after infection since herpes zoster itself will boost VZV-specific immunity.   Dr. BOTELLO

## 2024-12-09 ENCOUNTER — HOSPITAL ENCOUNTER (EMERGENCY)
Facility: HOSPITAL | Age: 64
Discharge: HOME | End: 2024-12-09
Attending: EMERGENCY MEDICINE
Payer: MEDICARE

## 2024-12-09 VITALS
WEIGHT: 165 LBS | OXYGEN SATURATION: 97 % | HEART RATE: 85 BPM | HEIGHT: 62 IN | DIASTOLIC BLOOD PRESSURE: 87 MMHG | RESPIRATION RATE: 18 BRPM | SYSTOLIC BLOOD PRESSURE: 153 MMHG | TEMPERATURE: 98 F | BODY MASS INDEX: 30.36 KG/M2

## 2024-12-09 DIAGNOSIS — R07.9 CHEST PAIN, UNSPECIFIED TYPE: Primary | ICD-10-CM

## 2024-12-09 DIAGNOSIS — R07.9 CHEST PAIN: ICD-10-CM

## 2024-12-09 LAB
ALBUMIN SERPL BCP-MCNC: 3.9 G/DL (ref 3.5–5.2)
ALP SERPL-CCNC: 99 U/L (ref 40–150)
ALT SERPL W/O P-5'-P-CCNC: 28 U/L (ref 10–44)
ANION GAP SERPL CALC-SCNC: 12 MMOL/L (ref 8–16)
AST SERPL-CCNC: 24 U/L (ref 10–40)
BASOPHILS # BLD AUTO: 0.07 K/UL (ref 0–0.2)
BASOPHILS NFR BLD: 0.6 % (ref 0–1.9)
BILIRUB SERPL-MCNC: 0.2 MG/DL (ref 0.1–1)
BUN SERPL-MCNC: 13 MG/DL (ref 8–23)
CALCIUM SERPL-MCNC: 9.1 MG/DL (ref 8.7–10.5)
CHLORIDE SERPL-SCNC: 108 MMOL/L (ref 95–110)
CO2 SERPL-SCNC: 20 MMOL/L (ref 23–29)
CREAT SERPL-MCNC: 0.7 MG/DL (ref 0.5–1.4)
DIFFERENTIAL METHOD BLD: NORMAL
EOSINOPHIL # BLD AUTO: 0.5 K/UL (ref 0–0.5)
EOSINOPHIL NFR BLD: 4.1 % (ref 0–8)
ERYTHROCYTE [DISTWIDTH] IN BLOOD BY AUTOMATED COUNT: 12.7 % (ref 11.5–14.5)
EST. GFR  (NO RACE VARIABLE): >60 ML/MIN/1.73 M^2
ETHANOL SERPL-MCNC: <10 MG/DL (ref 0–10)
GLUCOSE SERPL-MCNC: 124 MG/DL (ref 70–110)
HCT VFR BLD AUTO: 42.6 % (ref 37–48.5)
HGB BLD-MCNC: 14.3 G/DL (ref 12–16)
IMM GRANULOCYTES # BLD AUTO: 0.02 K/UL (ref 0–0.04)
IMM GRANULOCYTES NFR BLD AUTO: 0.2 % (ref 0–0.5)
LYMPHOCYTES # BLD AUTO: 4.8 K/UL (ref 1–4.8)
LYMPHOCYTES NFR BLD: 43.9 % (ref 18–48)
MCH RBC QN AUTO: 28.8 PG (ref 27–31)
MCHC RBC AUTO-ENTMCNC: 33.6 G/DL (ref 32–36)
MCV RBC AUTO: 86 FL (ref 82–98)
MONOCYTES # BLD AUTO: 1 K/UL (ref 0.3–1)
MONOCYTES NFR BLD: 9 % (ref 4–15)
NEUTROPHILS # BLD AUTO: 4.6 K/UL (ref 1.8–7.7)
NEUTROPHILS NFR BLD: 42.2 % (ref 38–73)
NRBC BLD-RTO: 0 /100 WBC
OHS QRS DURATION: 88 MS
OHS QTC CALCULATION: 456 MS
PLATELET # BLD AUTO: 239 K/UL (ref 150–450)
PMV BLD AUTO: 9.8 FL (ref 9.2–12.9)
POTASSIUM SERPL-SCNC: 4.6 MMOL/L (ref 3.5–5.1)
PROT SERPL-MCNC: 7.2 G/DL (ref 6–8.4)
RBC # BLD AUTO: 4.96 M/UL (ref 4–5.4)
SODIUM SERPL-SCNC: 140 MMOL/L (ref 136–145)
TROPONIN I SERPL DL<=0.01 NG/ML-MCNC: <0.006 NG/ML (ref 0–0.03)
TSH SERPL DL<=0.005 MIU/L-ACNC: 1.41 UIU/ML (ref 0.4–4)
WBC # BLD AUTO: 10.88 K/UL (ref 3.9–12.7)

## 2024-12-09 PROCEDURE — 93005 ELECTROCARDIOGRAM TRACING: CPT

## 2024-12-09 PROCEDURE — 71045 X-RAY EXAM CHEST 1 VIEW: CPT | Mod: 26,,, | Performed by: RADIOLOGY

## 2024-12-09 PROCEDURE — 80053 COMPREHEN METABOLIC PANEL: CPT | Performed by: EMERGENCY MEDICINE

## 2024-12-09 PROCEDURE — 84484 ASSAY OF TROPONIN QUANT: CPT | Performed by: EMERGENCY MEDICINE

## 2024-12-09 PROCEDURE — 25000003 PHARM REV CODE 250: Performed by: EMERGENCY MEDICINE

## 2024-12-09 PROCEDURE — 63600175 PHARM REV CODE 636 W HCPCS: Performed by: EMERGENCY MEDICINE

## 2024-12-09 PROCEDURE — 84443 ASSAY THYROID STIM HORMONE: CPT | Performed by: EMERGENCY MEDICINE

## 2024-12-09 PROCEDURE — 71045 X-RAY EXAM CHEST 1 VIEW: CPT | Mod: TC

## 2024-12-09 PROCEDURE — 96375 TX/PRO/DX INJ NEW DRUG ADDON: CPT

## 2024-12-09 PROCEDURE — 82077 ASSAY SPEC XCP UR&BREATH IA: CPT | Performed by: EMERGENCY MEDICINE

## 2024-12-09 PROCEDURE — 85025 COMPLETE CBC W/AUTO DIFF WBC: CPT | Performed by: EMERGENCY MEDICINE

## 2024-12-09 PROCEDURE — 93010 ELECTROCARDIOGRAM REPORT: CPT | Mod: ,,, | Performed by: INTERNAL MEDICINE

## 2024-12-09 PROCEDURE — 99285 EMERGENCY DEPT VISIT HI MDM: CPT | Mod: 25

## 2024-12-09 PROCEDURE — 96374 THER/PROPH/DIAG INJ IV PUSH: CPT

## 2024-12-09 RX ORDER — ASPIRIN 325 MG
325 TABLET ORAL
Status: COMPLETED | OUTPATIENT
Start: 2024-12-09 | End: 2024-12-09

## 2024-12-09 RX ORDER — PROCHLORPERAZINE EDISYLATE 5 MG/ML
5 INJECTION INTRAMUSCULAR; INTRAVENOUS ONCE
Status: COMPLETED | OUTPATIENT
Start: 2024-12-09 | End: 2024-12-09

## 2024-12-09 RX ORDER — NAPROXEN 375 MG/1
375 TABLET ORAL 2 TIMES DAILY WITH MEALS
Qty: 30 TABLET | Refills: 0 | Status: SHIPPED | OUTPATIENT
Start: 2024-12-09

## 2024-12-09 RX ORDER — MORPHINE SULFATE 2 MG/ML
2 INJECTION, SOLUTION INTRAMUSCULAR; INTRAVENOUS
Status: COMPLETED | OUTPATIENT
Start: 2024-12-09 | End: 2024-12-09

## 2024-12-09 RX ORDER — NAPROXEN 375 MG/1
375 TABLET ORAL 2 TIMES DAILY WITH MEALS
Qty: 30 TABLET | Refills: 0 | Status: SHIPPED | OUTPATIENT
Start: 2024-12-09 | End: 2024-12-09

## 2024-12-09 RX ADMIN — ASPIRIN 325 MG ORAL TABLET 325 MG: 325 PILL ORAL at 02:12

## 2024-12-09 RX ADMIN — PROCHLORPERAZINE EDISYLATE 5 MG: 5 INJECTION INTRAMUSCULAR; INTRAVENOUS at 03:12

## 2024-12-09 RX ADMIN — MORPHINE SULFATE 2 MG: 2 INJECTION, SOLUTION INTRAMUSCULAR; INTRAVENOUS at 03:12

## 2024-12-09 NOTE — ED PROVIDER NOTES
"Encounter Date: 12/9/2024       History     Chief Complaint   Patient presents with    Chest Pain     Presents with sternal chest "pressure" onset several hours. Denies SOB, nausea and radiation. States she took a xanax approx 2 hours PTA.      Patient presents to the emergency department for evaluation of chest pain.  Patient states he has been having chest pain for the last several hours.  She states her pain is midsternal and does not radiating were.  She has had no nausea or vomiting.  She denies any shortness of breath.  She has not taken anything for it chest pain.  She has no history of heart disease.  Patient states she did take a Xanax in case it was anxiety but it did not seem to help.  She denies any other complaints.    The history is provided by the patient.     Review of patient's allergies indicates:  No Known Allergies  Past Medical History:   Diagnosis Date    Abdominal pain 2/4/2023    Anxiety     Gallstones     Hepatic cyst 1/6/2021    High cholesterol     Hypothyroid     Migraines     Pruritus 1/11/2023    Renal cyst, left 1/6/2021    Stress incontinence (female) (male) 12/13/2012    Note: Unchanged     Past Surgical History:   Procedure Laterality Date    APPENDECTOMY  1976    bladder lift  2014    BREAST BIOPSY Left 2018    benign    CHOLECYSTECTOMY  10/01/2020    LUMPECTOMY,BREAST, WITH RADIOACTIVE SEED LOCALIZATION AND SENTINEL LYMPH NODE BIOPSY  2017    OOPHORECTOMY  1987    PARTIAL HYSTERECTOMY  1986    SHOULDER SURGERY Right     TUBAL LIGATION       Family History   Problem Relation Name Age of Onset    Diabetes Father      Lung cancer Father      Pacemaker/defibrilator Mother      Alzheimer's disease Mother      Breast cancer Maternal Aunt      Breast cancer Maternal Aunt       Social History     Tobacco Use    Smoking status: Never    Smokeless tobacco: Never   Substance Use Topics    Alcohol use: Not Currently     Comment: social    Drug use: Never     Review of Systems    Physical Exam "     Initial Vitals   BP Pulse Resp Temp SpO2   12/09/24 0205 12/09/24 0205 12/09/24 0203 12/09/24 0205 12/09/24 0205   (!) 156/82 90 16 97.7 °F (36.5 °C) (!) 94 %      MAP       --                Physical Exam    Nursing note and vitals reviewed.  Constitutional: She appears well-developed and well-nourished.   HENT:   Head: Normocephalic and atraumatic.   Right Ear: External ear normal.   Left Ear: External ear normal.   Nose: Nose normal. Mouth/Throat: Oropharynx is clear and moist.   Eyes: Conjunctivae and EOM are normal. Pupils are equal, round, and reactive to light.   Neck: Neck supple. No tracheal deviation present.   Normal range of motion.  Cardiovascular:  Normal rate, regular rhythm and normal heart sounds.           Pulmonary/Chest: Breath sounds normal. She has no wheezes.   Abdominal: Abdomen is soft. Bowel sounds are normal. There is no abdominal tenderness.   Musculoskeletal:         General: No tenderness. Normal range of motion.      Cervical back: Normal range of motion and neck supple.     Neurological: She is alert and oriented to person, place, and time. She has normal reflexes.   Skin: Skin is warm and dry. Capillary refill takes less than 2 seconds. No rash noted.         ED Course   Procedures  Labs Reviewed   COMPREHENSIVE METABOLIC PANEL - Abnormal       Result Value    Sodium 140      Potassium 4.6      Chloride 108      CO2 20 (*)     Glucose 124 (*)     BUN 13      Creatinine 0.7      Calcium 9.1      Total Protein 7.2      Albumin 3.9      Total Bilirubin 0.2      Alkaline Phosphatase 99      AST 24      ALT 28      eGFR >60.0      Anion Gap 12     CBC W/ AUTO DIFFERENTIAL    WBC 10.88      RBC 4.96      Hemoglobin 14.3      Hematocrit 42.6      MCV 86      MCH 28.8      MCHC 33.6      RDW 12.7      Platelets 239      MPV 9.8      Immature Granulocytes 0.2      Gran # (ANC) 4.6      Immature Grans (Abs) 0.02      Lymph # 4.8      Mono # 1.0      Eos # 0.5      Baso # 0.07      nRBC 0       Gran % 42.2      Lymph % 43.9      Mono % 9.0      Eosinophil % 4.1      Basophil % 0.6      Differential Method Automated     TROPONIN I    Troponin I <0.006     TSH    TSH 1.407     ALCOHOL,MEDICAL (ETHANOL)    Alcohol, Serum <10            Imaging Results              X-Ray Chest AP Portable (Final result)  Result time 12/09/24 02:37:59      Final result by Giselle New MD (12/09/24 02:37:59)                   Impression:      No acute intrathoracic abnormality identified on this single radiographic view of the chest.      Electronically signed by: Giselle New MD  Date:    12/09/2024  Time:    02:37               Narrative:    EXAMINATION:  XR CHEST AP PORTABLE    CLINICAL HISTORY:  Chest pain, unspecified    TECHNIQUE:  Single frontal view of the chest was performed.    COMPARISON:  None    FINDINGS:  Cardiac monitoring leads overlie the chest.  The cardiomediastinal silhouette is within normal limits. Mediastinal structures are midline.  The lungs appear symmetrically expanded without definite evidence of confluent airspace consolidation, significant volume of pleural fluid or pneumothorax.  Visualized osseous structures are intact.                                       Medications   aspirin tablet 325 mg (325 mg Oral Given 12/9/24 0213)   morphine injection 2 mg (2 mg Intravenous Given 12/9/24 0305)   prochlorperazine injection Soln 5 mg (5 mg Intravenous Given 12/9/24 0305)     Medical Decision Making  History is obtained from the patient.  All labs and x-rays are reviewed by myself.  Differential diagnosis includes, but is not limited to, ACS/AMI/STEMI/esophagitis/GERD/pericarditis/pleurisy  Patient has a primary care provider and no decreased access to healthcare.    Lab work reveals no acute process.  EKG is within normal limits.  Cardiac enzymes are negative.  Patient states her pain is better with pain medication.  We will discharge patient home to follow up with her primary care provider.  I  discussed all the findings with the patient and her  and answered all of her questions prior to discharge.    Amount and/or Complexity of Data Reviewed  Labs: ordered.  Radiology: ordered.  ECG/medicine tests: ordered and independent interpretation performed.     Details: EKG reveals normal sinus rhythm with a heart rate of 84.  There is no evidence of acute ischemic change noted.  There is no axis deviation noted.  The EKGs unchanged from 2021.    Risk  OTC drugs.  Prescription drug management.                                      Clinical Impression:  Final diagnoses:  [R07.9] Chest pain  [R07.9] Chest pain, unspecified type (Primary)          ED Disposition Condition    Discharge Stable          ED Prescriptions       Medication Sig Dispense Start Date End Date Auth. Provider    naproxen (NAPROSYN) 375 MG tablet Take 1 tablet (375 mg total) by mouth 2 (two) times daily with meals. 30 tablet 12/9/2024 -- Javier Power, DO          Follow-up Information       Follow up With Specialties Details Why Contact Info    Lizzie Huff MD Internal Medicine Schedule an appointment as soon as possible for a visit   [facility] [facility] [address]  [phone]               Javier Power DO  12/09/24 1375

## 2024-12-09 NOTE — ED NOTES
Rounding on pt. Pt sitting up in bed. Pt reports intermittent chest pain/pressure is still present. Pt states pain is a 7 on 0/10 pain scale. DO notified.

## 2025-02-24 DIAGNOSIS — Z00.00 ENCOUNTER FOR MEDICARE ANNUAL WELLNESS EXAM: ICD-10-CM

## 2025-03-19 ENCOUNTER — PATIENT MESSAGE (OUTPATIENT)
Dept: OBSTETRICS AND GYNECOLOGY | Facility: CLINIC | Age: 65
End: 2025-03-19
Payer: MEDICARE

## 2025-03-19 RX ORDER — CIPROFLOXACIN 500 MG/1
500 TABLET ORAL EVERY 12 HOURS
Qty: 14 TABLET | Refills: 0 | Status: SHIPPED | OUTPATIENT
Start: 2025-03-19 | End: 2025-03-26

## 2025-04-14 ENCOUNTER — LAB VISIT (OUTPATIENT)
Dept: LAB | Facility: HOSPITAL | Age: 65
End: 2025-04-14
Attending: OBSTETRICS & GYNECOLOGY
Payer: MEDICARE

## 2025-04-14 ENCOUNTER — PATIENT MESSAGE (OUTPATIENT)
Dept: OBSTETRICS AND GYNECOLOGY | Facility: CLINIC | Age: 65
End: 2025-04-14
Payer: MEDICARE

## 2025-04-14 DIAGNOSIS — R39.89 SUSPECTED UTI: Primary | ICD-10-CM

## 2025-04-14 DIAGNOSIS — R39.89 SUSPECTED UTI: ICD-10-CM

## 2025-04-14 PROCEDURE — 87086 URINE CULTURE/COLONY COUNT: CPT

## 2025-04-14 RX ORDER — NITROFURANTOIN 25; 75 MG/1; MG/1
100 CAPSULE ORAL 2 TIMES DAILY
Qty: 14 CAPSULE | Refills: 0 | Status: SHIPPED | OUTPATIENT
Start: 2025-04-14 | End: 2025-04-21

## 2025-04-16 ENCOUNTER — RESULTS FOLLOW-UP (OUTPATIENT)
Dept: OBSTETRICS AND GYNECOLOGY | Facility: CLINIC | Age: 65
End: 2025-04-16

## 2025-04-16 LAB — BACTERIA UR CULT: NORMAL

## 2025-05-22 ENCOUNTER — HOSPITAL ENCOUNTER (OUTPATIENT)
Dept: RADIOLOGY | Facility: HOSPITAL | Age: 65
Discharge: HOME | End: 2025-05-22
Attending: INTERNAL MEDICINE
Payer: MEDICARE

## 2025-05-22 DIAGNOSIS — Z12.31 ENCOUNTER FOR SCREENING MAMMOGRAM FOR BREAST CANCER: ICD-10-CM

## 2025-05-22 PROCEDURE — 77063 BREAST TOMOSYNTHESIS BI: CPT | Mod: TC

## 2025-05-26 ENCOUNTER — RESULTS FOLLOW-UP (OUTPATIENT)
Dept: PRIMARY CARE CLINIC | Facility: CLINIC | Age: 65
End: 2025-05-26

## 2025-05-26 NOTE — PROGRESS NOTES
I sent pt a my chart message -  I reviewed your Mammogram-   The breasts are heterogeneously dense, which may obscure small masses. There is no evidence of suspicious masses, microcalcifications or architectural distortion.  Impression:   No mammographic evidence of malignancy.  Routine screening mammogram in 1 year is recommended.  Dr. BOTELLO

## 2025-07-06 NOTE — PROGRESS NOTES
[facility] Note    Chief Complaint      Chief Complaint   Patient presents with    Follow-up       History of Present Illness      Mayelin Chowdary is a 65 y.o.    WF with Fatty liver, Hepatic cysts, Left parapelvic cyst, Obesity, and migraines presents to fu Migraines x 9 days.  travels to [facility] for care. Referred by Kinza Lieberman. Last visit -3/11/24     Enrolled in REACH trial    Interim:  Dr Sotomayor rx cipro for urinary sx's in Apr. and May 2024  She had a UTI on March 19th 2025 and was treated with Cipro. No cx was done. She states it is back. She had a Ucx in Apr 2025 -neg.    ED 5/16/24 - Ha  ED 12/9/24 -CP  H/o shingles - 6/2024     +KEYUR - KEYUR was positive and Lupus panel was negative.  I saw her AntiSmooth muscle Antibody that Hepatology ordered was mildly positive and defer to them on that matter as this is usually a test for autoimmune hepatitis but her level was barely elevated so not sure this is clinically significant. She fu with Dr. Kelly.      Fatty Liver - Seen on MRI 10/16/18 at [facility]. Rec limit APAP/ETOH.  Rec diet and exercise for wt loss.  D/w pt potential for cirrhosis. CTA Abd- 1/12/23  - Hepatic steatosis.  Stable hepatic cysts and arterial enhancing lesion in the right hepatic lobe that could represent arterial portal shunting.  No suspicious lesions identified.Increased mesenteric stranding in the mid abdomen with multiple prominent mesenteric lymph nodes.  Findings likely represent mesenteric adenitis and are similar to prior MRI.   Mri abd - 12/2/22 -  + Fatty liver/hepatosteatosis, Multiple liver cysts largest of which is 2 cm. There is a small focus of arterial portal shunting enhancing in the inferior right hepatic lobe. Unsure of the cause of this finding possible due to one of the cysts compressing things? Multiple parapelvic renal cysts, primarily in left kidney.I referred her to Hepatology to further investigate.  Fibroscan - 1/12/23- 0-2; S - 3.  Fu by  hepatology.  FIB-4 Calculation: 1.25 at 12/9/2024  2:09 AM  Calculated from:  SGOT/AST: 24 U/L at 12/9/2024  2:09 AM  SGPT/ALT: 28 U/L at 12/9/2024  2:09 AM  Platelets: 239 K/uL at 12/9/2024  2:09 AM  Age: 64 years   FIB-4 below 1.30 is considered as low-risk for advanced fibrosis  FIB-4 over 2.67 is considered as high-risk for advanced fibrosis  FIB-4 values between 1.30 and 2.67 are considered as intermediate-risk of advanced fibrosis for ages 36-64.   For ages > 64 the cut-off for low-risk goes to < 2.     H/o Pruritis - Resolved. Rx Hydroxyzine to take every 8 hrs as needed for itching and also rec she take Famotidine  (Pepcid OTC) 20 mg once-twice daily - this is an acid reduced but helps with hives as it is a different type of antihistamine.  This helps when she takes it which is sparingly.  She fu with Derm, Dr. [facility], and A/I, Dr. Santana. Allergy testing was neg. per pt. She reports itching after taking Tylenol so she avoids it.      Hepatic Cysts - Seen on MRI 10/16/18 at [facility]. Fu by Hepatology.     Left parapelvic renal cyst - seen on MRI 10/16/18 at [facility].  Multiple parapelvic renal cysts, primarily in left kidney.     Overweight - BMI - 30.77-  Pt off Phentermine.  She walks most days and plans to start a low carb diet.      HLD - Prev controlled on Crestor 40 mg daily- she stopped it 3 mos ago due to leg cramps x 1 wk. Continue a low cholesterol diet and exercise. Pt can visit the American heart association website at heart.org for further info on a low cholesterol diet.  Fu cards in Sept.     Migraine - HA-  Imitrex and Excedrin not presently helping. She cut back on dairy.  Had Migraines in her 30's. Tylenol, Excedrine migraine, and ibuprofen no help. No phonophobia. No aura.  Sometimes wakes up with the HA.  No worse with bending coughing.  No assoc N/V.  Fioricet no help.  She thinks maxalt helped when she was younger. Pt off amitriptyline 50 mcg/d since 4 wks ago because it did not help with  "sleep.  Ha have worsened since 9 days ago- constant.  She stayed in bed the 1st 4 days. 10/10 in severity. +dizziness- comes and goes and is brief. ?Imbalance. No syncope or presyncope. 3-4/10 in severity. Last took Imitrex 7 days ago. Has been taking Excedrine kelle past several days. They wake her. It was the worst HA of her life over the weekend. No worse with bending, coughing, Sneezing. Ice helps a little.  We changed to an alternative to Imitrex called relpax. MRI Brain 3/22/24 - There was No acute intracranial process.  Mild bilateral ethmoid and bilateral maxillary sinus disease.  Dr. Vitale gave her nurtec and medrol - 4/17/24. Nurtec too expensive but she has some samples.      Anxiety -  She has always had flight anxiety. She has taken amitriptyline in past but was not for migraines. "I've got a lot of anxiety".  has been ill.  Pt not on Venlafaxine ER.   She takes alprazolam prn sparingly.  Cont current for now. Pt prefers prn medication.      Depression - Pt scored 4 on PHQ2 and 13 on PHQ 9 - c/w moderate depression.Pt not on Venlafaxine ER.   Doing well.  had to have toe amputated due to melanoma. [facility] is being tx at  [facility]       Hypothyroidism  -TSH controlled in Dec.  on Levothyroxine to 137 mcg daily. Pt taking this medication by itself at least 30 minutes prior to any other meds or food.  She fu with Dr. Steffanie Clay.   Thyroid U/S - 1/31/22 -  thyroid Ultrasound. Heterogenous thyroid parenchyma without discrete nodule, similar to prior, and may reflect sequela of chronic/prior thyroiditis. Has appt with Dr. Whipple 10/21/25.     Carpal tunnel - Numbness tyrel hands - for the past couple mos.  Worse at night. She saw Dr. Kelly for this. She was told to sleep with wrist splints tyrel which helps but even driving or brushing her hair the hands go numb.  Steroid inj helped in past. Doing well s/p recent injections. Could consider EMG in future and referral back to Dr. Monzon in future if needed. "      Insomnia - Pt preferred to inc the amitriptyline rather than add Trazodone. No snoring. No longer on amitriptyline - was not helping.      Memory issues - Pt forgot to put coffee pot under the coffee machine and coffee went everywhere. She called her grandkids her nieces and she doesn't have any nieces. Her  and daughter have noticed. Of note, she has not been sleeping well. She has been under inc stress. She and  have been going back and forth to [facility] and recently spent 3 mos there.   Consider Neuropsych eval in future. Vitamin B12 level which was normal. Thiamine and RPR were both normal. MRI Brain - 3/22/24 - No acute intracranial process.  Mild bilateral ethmoid and bilateral maxillary sinus disease.      HCM - Flu - none; Tdap - 10/5/15;  PCV 20 -  7/31/23;  Shingrix - none - defers;  COVID -19 vaccine (Moderna) #1 -2/3/21 #2 - 3/3/21; # 3 - 11/29/21; # 4 7/26/22; # 5 declines; MGM 5/22/25 - repeat 1 yr.;  DEXA - 4/4/23 - wnl; PAP - s/p hys in 20's and unilateral salpingoophorectomy; Hep C Screen - neg -1/12/21;  HIV Screen -  neg -1/12/21 ; C-scope -6/1/22 - hemorrhoids - repeat 10 yrs;  Prev PCP - Dr. Mcdermott;  Ob/GYN - Dr. Sotomayor; Rheum - Dr. Kelly; Prev GI - Dr. Hutson; ENT - Dr. Toscano; Hand Sx - Dr. Monzon;  Well visit - 7/14/22 - with Dr. Gaming    Patient Care Team:  Lizzie Huff MD as PCP - General (Internal Medicine)  Ab Valenzuela Jr., MD as Consulting Physician (Gastroenterology)     Health Maintenance:  Immunization History   Administered Date(s) Administered    COVID-19 MRNA, LN-S PF (MODERNA HALF 0.25 ML DOSE) 11/29/2021, 07/26/2022    COVID-19 Vaccine 02/03/2021, 03/03/2021    Influenza 11/07/2020    Influenza (FLUAD) - Quadrivalent - Adjuvanted - PF *Preferred* (65+) 12/04/2023    Influenza - Quadrivalent 10/29/2019    Influenza - Quadrivalent - MDCK - PF 12/28/2021    Influenza - Quadrivalent - PF *Preferred* (6 months and older) 11/08/2018,  10/31/2022    Influenza - Trivalent - Afluria, Fluzone MDV 10/17/2008    Influenza - Trivalent - Fluarix, Flulaval, Fluzone, Afluria - PF 01/11/2013, 10/05/2015    Pneumococcal Conjugate - 20 Valent 07/31/2023    Tdap 10/05/2015      Health Maintenance   Topic Date Due    Shingles Vaccine (1 of 2) Never done    RSV Vaccine (Age 60+ and Pregnant patients) (1 - Risk 60-74 years 1-dose series) Never done    COVID-19 Vaccine (5 - 2024-25 season) 09/01/2024    Influenza Vaccine (1) 09/01/2025    TETANUS VACCINE  10/05/2025    Hemoglobin A1c (Diabetic Prevention Screening)  11/03/2025    Mammogram  05/22/2026    DEXA Scan  04/04/2027    Lipid Panel  11/30/2028    Colorectal Cancer Screening  06/01/2032    Hepatitis C Screening  Completed    Pneumococcal Vaccines (Age 50+)  Completed        Past Medical History:  Past Medical History:   Diagnosis Date    Abdominal pain 2/4/2023    Anxiety     Gallstones     Hepatic cyst 1/6/2021    High cholesterol     Hypothyroid     Migraines     Pruritus 1/11/2023    Renal cyst, left 1/6/2021    Stress incontinence (female) (male) 12/13/2012    Note: Unchanged       Past Surgical History:   has a past surgical history that includes Tubal ligation; Shoulder surgery (Right); Appendectomy (1976); Oophorectomy (1987); LUMPECTOMY,BREAST,WITH RADIOACTIVE SEED LOCALIZATION AND SENTINEL LYMPH NODE BIOPSY (2017); Cholecystectomy (10/01/2020); Partial hysterectomy (1986); bladder lift (2014); Breast biopsy (Left, 2018); Tonsillectomy (1961); and Hysterectomy (1985).    Family History:  family history includes Alzheimer's disease in her mother; Breast cancer in her maternal aunt and maternal aunt; Diabetes in her father; Lung cancer in her father; Pacemaker/defibrilator in her mother; Parkinsonism in her brother, cousin, and cousin.     Social History:  Social History[1]    Review of Systems   Constitutional:  Positive for night sweats. Negative for chills and fever.   HENT:  Negative for hearing  "loss.    Eyes:  Negative for visual disturbance.   Respiratory:  Positive for chest tightness. Negative for shortness of breath.    Cardiovascular:  Negative for chest pain, palpitations and leg swelling.   Gastrointestinal:  Positive for nausea. Negative for abdominal pain, constipation, diarrhea, vomiting and reflux.        Has appt with Dr. Hutson 7/22/25   Endocrine: Positive for polydipsia. Negative for cold intolerance and heat intolerance.   Genitourinary:  Negative for bladder incontinence, dysuria, frequency and hematuria.   Musculoskeletal:  Positive for arthralgias. Negative for myalgias.        +knees - rarely takes ibuprofen   Neurological:  Negative for dizziness and headaches.   Psychiatric/Behavioral:  The patient is nervous/anxious.         Medications:  Current Medications[2]     Allergies:  Review of patient's allergies indicates:  No Known Allergies    Physical Exam      Vital Signs  Temp: 98.2 °F (36.8 °C)  Temp Source: Oral  Pulse: 68  Resp: 18  SpO2: 99 %  BP: (!) 140/88  BP Location: Left arm  Patient Position: Sitting  Pain Score: 0-No pain  Height and Weight  Height: 5' 2" (157.5 cm)  Weight: 76.3 kg (168 lb 3.4 oz)  BSA (Calculated - sq m): 1.83 sq meters  BMI (Calculated): 30.8  Weight in (lb) to have BMI = 25: 136.4      Patient Position: Sitting  Vitals:    07/09/25 1334 07/09/25 1431   BP: (!) 142/90 (!) 140/88   BP Location: Left arm    Patient Position: Sitting    Pulse:     Resp:     Temp:     TempSrc:     SpO2:     Weight:     Height:          Physical Exam  Vitals reviewed.   Constitutional:       General: She is not in acute distress.     Appearance: Normal appearance. She is not ill-appearing, toxic-appearing or diaphoretic.   HENT:      Head: Normocephalic and atraumatic.      Right Ear: Tympanic membrane normal.      Left Ear: Tympanic membrane normal.      Mouth/Throat:      Mouth: Mucous membranes are moist.   Eyes:      Extraocular Movements: Extraocular movements intact. "      Conjunctiva/sclera: Conjunctivae normal.      Pupils: Pupils are equal, round, and reactive to light.   Neck:      Vascular: No carotid bruit.   Cardiovascular:      Rate and Rhythm: Normal rate and regular rhythm.      Pulses: Normal pulses.      Heart sounds: Normal heart sounds. No murmur heard.  Pulmonary:      Effort: No respiratory distress.      Breath sounds: Normal breath sounds. No wheezing.   Abdominal:      General: Bowel sounds are normal. There is no distension.      Palpations: Abdomen is soft.      Tenderness: There is no abdominal tenderness. There is no guarding or rebound.   Musculoskeletal:         General: Normal range of motion.   Skin:     General: Skin is warm.   Neurological:      General: No focal deficit present.      Mental Status: She is alert and oriented to person, place, and time.   Psychiatric:         Mood and Affect: Mood normal.         Behavior: Behavior normal.          Laboratory:  CBC:  Recent Labs   Lab 01/11/23  0846 12/09/24  0209   WBC 7.15 10.88   RBC 4.91 4.96   Hemoglobin 14.1 14.3   Hematocrit 43.9 42.6   Platelets 262 239   MCV 89 86   MCH 28.7 28.8   MCHC 32.1 33.6       CMP:  Recent Labs   Lab 01/11/23  0846 11/30/23  1112 03/11/24  1426 12/09/24  0209   Glucose 111 H 98  --  124 H   Calcium 9.5 9.4  --  9.1   Albumin 4.2 4.3  --  3.9   Total Protein 7.2 7.2  --  7.2   Sodium 141 142  --  140   Potassium 3.7 4.3  --  4.6   CO2 26 29  --  20 L   Chloride 105 104  --  108   BUN 14 15  --  13   Creatinine 0.8  0.8 0.8   < > 0.7   Alkaline Phosphatase 77 70  --  99   ALT 25 24  --  28   AST 21 20  --  24   Total Bilirubin 0.7 0.5  --  0.2    < > = values in this interval not displayed.       URINALYSIS:  Recent Labs   Lab 04/24/24  1021   Color, UA Yellow   Specific Gravity, UA 1.010   pH, UA 6.0   Protein, UA Negative   Nitrite, UA Negative   Leukocytes, UA Negative        LIPIDS:  Recent Labs   Lab 07/13/22  1000 11/03/22  1028 11/03/22  1028 01/11/23  0846  11/30/23  1112 12/09/24  0209   TSH  --  5.678 H   < > 0.022 L 3.686 1.407   HDL 53 44  --   --  53  --    Cholesterol 271 H 283 H  --   --  193  --    Triglycerides 278 H 206 H  --   --  96  --    LDL Cholesterol 162.4 H 197.8 H  --   --  120.8  --    HDL/Cholesterol Ratio 19.6 L 15.5 L  --   --  27.5  --    Non-HDL Cholesterol 218 239  --   --  140  --    Total Cholesterol/HDL Ratio 5.1 H 6.4 H  --   --  3.6  --     < > = values in this interval not displayed.       TSH:  Recent Labs   Lab 01/11/23  0846 11/30/23  1112 12/09/24  0209   TSH 0.022 L 3.686 1.407       A1C:  Recent Labs   Lab 11/03/22  1028   Hemoglobin A1C 5.5          Other:   Recent Labs   Lab 12/04/23  0837   Vitamin B-12 673           Assessment/Plan     Mayelin Chowdary is a 65 y.o.female with:    Hyperlipidemia, unspecified hyperlipidemia type  -     LIPOPROTEIN A (LPA); Future; Expected date: 07/09/2025  -     Comprehensive Metabolic Panel; Future; Expected date: 07/09/2025  -     Lipid Panel; Future; Expected date: 07/09/2025  -     CK; Future; Expected date: 07/09/2025  -     rosuvastatin (CRESTOR) 20 MG tablet; Take 1 tablet (20 mg total) by mouth once daily.  Dispense: 90 tablet; Refill: 1  - Markedly improved on statin. Will dec Crestor 40 to 20 mg/d and see how she does with leg cramps.  Repeat FLP and check CK, and Lipo A. Check CT calcium score.     Fatty liver  - Rec limit tylenol and alcohol.  Rec diet and exercise for wt loss.  Fu with hepatology.     Hypothyroidism, unspecified type  -     levothyroxine (SYNTHROID) 137 MCG Tab tablet; Take 1 tablet (137 mcg total) by mouth before breakfast.  Dispense: 90 tablet; Refill: 1  - Controlled.  Cont current. Has appt with Dr. Whipple in Oct.    Situational anxiety  -     ALPRAZolam (XANAX) 0.25 MG tablet; TAKE 1 TABLET BY MOUTH DAILY AS NEEDED FOR ANXIETY  Dispense: 30 tablet; Refill: 0  - Stable.  Cont current regimen.    Nausea  -     promethazine (PHENERGAN) 25 MG tablet; Take 1 tablet  (25 mg total) by mouth every 6 (six) hours as needed.  Dispense: 30 tablet; Refill: 0  - She has an upcoming appt with Dr. Caryl ZAMORA, 7/22/25.     IFG (impaired fasting glucose)  -     Hemoglobin A1C; Future; Expected date: 07/09/2025  - Repeat Ha1c.     Encounter for screening for cardiovascular disorders  -     CT Calcium Scoring Cardiac; Future; Expected date: 07/09/2025    Screening mammogram, encounter for  -     Mammo Digital Screening Bilat w/ Solomon (XPD); Future; Expected date: 05/23/2026    Chronic conditions status updated as per HPI.  Other than changes above, cont current medications and maintain follow up with specialists.  Follow up in about 6 months (around 1/9/2026) for fu chronic issues or sooner if needed.      Lizzie Huff MD  [facility]                       [1]   Social History  Tobacco Use    Smoking status: Never    Smokeless tobacco: Never   Vaping Use    Vaping status: Never Used   Substance Use Topics    Alcohol use: Not Currently     Comment: rare    Drug use: Never   [2]   Current Outpatient Medications:     hydrOXYzine HCL (ATARAX) 25 MG tablet, Take 1 tablet (25 mg total) by mouth 3 (three) times daily as needed for Itching., Disp: 90 tablet, Rfl: 0    ipratropium (ATROVENT) 42 mcg (0.06 %) nasal spray, 2 sprays by Each Nostril route 2 (two) times daily., Disp: 15 mL, Rfl: 3    ALPRAZolam (XANAX) 0.25 MG tablet, TAKE 1 TABLET BY MOUTH DAILY AS NEEDED FOR ANXIETY, Disp: 30 tablet, Rfl: 0    eletriptan (RELPAX) 40 MG tablet, 1 tab po at onset of migraine and can repeat in 2 hrs - max of 2 in 24 hrs (Patient not taking: Reported on 7/9/2025), Disp: 12 tablet, Rfl: 0    levothyroxine (SYNTHROID) 137 MCG Tab tablet, Take 1 tablet (137 mcg total) by mouth before breakfast., Disp: 90 tablet, Rfl: 1    promethazine (PHENERGAN) 25 MG tablet, Take 1 tablet (25 mg total) by mouth every 6 (six) hours as needed., Disp: 30 tablet, Rfl: 0    rosuvastatin (CRESTOR) 20 MG tablet, Take 1  tablet (20 mg total) by mouth once daily., Disp: 90 tablet, Rfl: 1

## 2025-07-09 ENCOUNTER — TELEPHONE (OUTPATIENT)
Dept: RESEARCH | Facility: HOSPITAL | Age: 65
End: 2025-07-09
Payer: MEDICARE

## 2025-07-09 ENCOUNTER — OFFICE VISIT (OUTPATIENT)
Dept: PRIMARY CARE CLINIC | Facility: CLINIC | Age: 65
End: 2025-07-09
Payer: MEDICARE

## 2025-07-09 ENCOUNTER — PATIENT MESSAGE (OUTPATIENT)
Dept: RESEARCH | Facility: HOSPITAL | Age: 65
End: 2025-07-09
Payer: MEDICARE

## 2025-07-09 VITALS
HEART RATE: 68 BPM | SYSTOLIC BLOOD PRESSURE: 140 MMHG | OXYGEN SATURATION: 99 % | DIASTOLIC BLOOD PRESSURE: 88 MMHG | BODY MASS INDEX: 30.95 KG/M2 | RESPIRATION RATE: 18 BRPM | HEIGHT: 62 IN | WEIGHT: 168.19 LBS | TEMPERATURE: 98 F

## 2025-07-09 DIAGNOSIS — E03.9 HYPOTHYROIDISM, UNSPECIFIED TYPE: ICD-10-CM

## 2025-07-09 DIAGNOSIS — F41.8 SITUATIONAL ANXIETY: ICD-10-CM

## 2025-07-09 DIAGNOSIS — R11.0 NAUSEA: ICD-10-CM

## 2025-07-09 DIAGNOSIS — Z12.31 SCREENING MAMMOGRAM, ENCOUNTER FOR: ICD-10-CM

## 2025-07-09 DIAGNOSIS — E78.5 HYPERLIPIDEMIA, UNSPECIFIED HYPERLIPIDEMIA TYPE: Primary | ICD-10-CM

## 2025-07-09 DIAGNOSIS — R73.01 IFG (IMPAIRED FASTING GLUCOSE): ICD-10-CM

## 2025-07-09 DIAGNOSIS — Z13.6 ENCOUNTER FOR SCREENING FOR CARDIOVASCULAR DISORDERS: ICD-10-CM

## 2025-07-09 DIAGNOSIS — K76.0 FATTY LIVER: ICD-10-CM

## 2025-07-09 PROCEDURE — 99214 OFFICE O/P EST MOD 30 MIN: CPT | Mod: PBBFAC,PN | Performed by: INTERNAL MEDICINE

## 2025-07-09 PROCEDURE — 99999 PR PBB SHADOW E&M-EST. PATIENT-LVL IV: CPT | Mod: PBBFAC,,, | Performed by: INTERNAL MEDICINE

## 2025-07-09 PROCEDURE — G2211 COMPLEX E/M VISIT ADD ON: HCPCS | Mod: ,,, | Performed by: INTERNAL MEDICINE

## 2025-07-09 PROCEDURE — 99214 OFFICE O/P EST MOD 30 MIN: CPT | Mod: S$PBB,,, | Performed by: INTERNAL MEDICINE

## 2025-07-09 RX ORDER — LEVOTHYROXINE SODIUM 137 UG/1
137 TABLET ORAL
Qty: 90 TABLET | Refills: 1 | Status: SHIPPED | OUTPATIENT
Start: 2025-07-09

## 2025-07-09 RX ORDER — ALPRAZOLAM 0.25 MG/1
TABLET ORAL
Qty: 30 TABLET | Refills: 0 | Status: SHIPPED | OUTPATIENT
Start: 2025-07-09

## 2025-07-09 RX ORDER — PROMETHAZINE HYDROCHLORIDE 25 MG/1
25 TABLET ORAL EVERY 6 HOURS PRN
Qty: 30 TABLET | Refills: 0 | Status: SHIPPED | OUTPATIENT
Start: 2025-07-09

## 2025-07-09 RX ORDER — ROSUVASTATIN CALCIUM 20 MG/1
20 TABLET, COATED ORAL DAILY
Qty: 90 TABLET | Refills: 1 | Status: SHIPPED | OUTPATIENT
Start: 2025-07-09

## 2025-07-10 ENCOUNTER — TELEPHONE (OUTPATIENT)
Dept: RESEARCH | Facility: HOSPITAL | Age: 65
End: 2025-07-10
Payer: MEDICARE

## 2025-07-10 ENCOUNTER — LAB VISIT (OUTPATIENT)
Dept: LAB | Facility: HOSPITAL | Age: 65
End: 2025-07-10
Attending: INTERNAL MEDICINE
Payer: MEDICARE

## 2025-07-10 DIAGNOSIS — E78.5 HYPERLIPIDEMIA, UNSPECIFIED HYPERLIPIDEMIA TYPE: ICD-10-CM

## 2025-07-10 DIAGNOSIS — R73.01 IFG (IMPAIRED FASTING GLUCOSE): ICD-10-CM

## 2025-07-10 LAB
ALBUMIN SERPL BCP-MCNC: 4.4 G/DL (ref 3.5–5.2)
ALP SERPL-CCNC: 70 UNIT/L (ref 40–150)
ALT SERPL W/O P-5'-P-CCNC: 24 UNIT/L (ref 10–44)
ANION GAP (OHS): 6 MMOL/L (ref 8–16)
AST SERPL-CCNC: 18 UNIT/L (ref 11–45)
BILIRUB SERPL-MCNC: 0.4 MG/DL (ref 0.1–1)
BUN SERPL-MCNC: 14 MG/DL (ref 8–23)
CALCIUM SERPL-MCNC: 9.3 MG/DL (ref 8.7–10.5)
CHLORIDE SERPL-SCNC: 107 MMOL/L (ref 95–110)
CHOLEST SERPL-MCNC: 256 MG/DL (ref 120–199)
CHOLEST/HDLC SERPL: 5.4 {RATIO} (ref 2–5)
CK SERPL-CCNC: 69 U/L (ref 20–180)
CO2 SERPL-SCNC: 27 MMOL/L (ref 23–29)
CREAT SERPL-MCNC: 0.8 MG/DL (ref 0.5–1.4)
EAG (OHS): 108 MG/DL (ref 68–131)
GFR SERPLBLD CREATININE-BSD FMLA CKD-EPI: >60 ML/MIN/1.73/M2
GLUCOSE SERPL-MCNC: 109 MG/DL (ref 70–110)
HBA1C MFR BLD: 5.4 % (ref 4–5.6)
HDLC SERPL-MCNC: 47 MG/DL (ref 40–75)
HDLC SERPL: 18.4 % (ref 20–50)
LDLC SERPL CALC-MCNC: 176.4 MG/DL (ref 63–159)
NONHDLC SERPL-MCNC: 209 MG/DL
POTASSIUM SERPL-SCNC: 4.8 MMOL/L (ref 3.5–5.1)
PROT SERPL-MCNC: 7.4 GM/DL (ref 6–8.4)
SODIUM SERPL-SCNC: 140 MMOL/L (ref 136–145)
TRIGL SERPL-MCNC: 163 MG/DL (ref 30–150)

## 2025-07-10 PROCEDURE — 83036 HEMOGLOBIN GLYCOSYLATED A1C: CPT

## 2025-07-10 PROCEDURE — 36415 COLL VENOUS BLD VENIPUNCTURE: CPT

## 2025-07-10 PROCEDURE — 80061 LIPID PANEL: CPT

## 2025-07-10 PROCEDURE — 83695 ASSAY OF LIPOPROTEIN(A): CPT

## 2025-07-10 PROCEDURE — 80053 COMPREHEN METABOLIC PANEL: CPT

## 2025-07-10 PROCEDURE — 82550 ASSAY OF CK (CPK): CPT

## 2025-07-10 NOTE — TELEPHONE ENCOUNTER
Study title: [employer]ANDO ReserveOut  IRB #: 2024.114      Patient called to discuss participation into the [employer]ANDO ReserveOut study. Explained overview of study. Patient expressed interest and is willing to see us on 7/22/2025 @ 1:00 PM. Patient voiced understanding.  Reminder MyChart message will be sent about appointment. SKYE

## 2025-07-11 ENCOUNTER — PATIENT MESSAGE (OUTPATIENT)
Dept: PRIMARY CARE CLINIC | Facility: CLINIC | Age: 65
End: 2025-07-11
Payer: MEDICARE

## 2025-07-11 NOTE — TELEPHONE ENCOUNTER
LOV with Lizzie Huff MD , 7/9/2025  Pt read your result note from this afternoon. Asking your thoughts on the anion gap which is being flagged as abnormal

## 2025-07-14 LAB — W LP(A): 16 NMOL/L

## 2025-07-22 ENCOUNTER — RESEARCH ENCOUNTER (OUTPATIENT)
Dept: RESEARCH | Facility: HOSPITAL | Age: 65
End: 2025-07-22
Payer: MEDICARE

## 2025-07-22 NOTE — PROGRESS NOTES
Study Title: ALANNA: Real-world Evidence to Advance Multi-Cancer Early Detection Health Equity (REACH/Galleri-Medicare study)    Protocol IRB #: 2024.114     Sponsor: [employer]O    : Chencho Acosta MD    Patient eligibility was checked prior to enrollment in the study. Patient met the following inclusion and exclusion criteria:     INCLUSION CRITERIA  Participant age is 50 years or older with Medicare coverage at the time of signing the Informed Consent form  Participant is eligible to receive the Galleri test, based on a determination by the study investigator or designee  Participant is capable of giving signed Informed Consent that is legally effective (consent provided by LAR is not permitted)  Participant is able to comprehend and respond to questions in participant questionnaires.    EXCLUSION CRITERIA  Participant having had a previous Galleri test not associated with this study  Participant is undergoing or referred for diagnostic evaluation due to clinical suspicion for cancer  Participant has a personal history of invasive or hematologic malignancy, diagnosed within the last 3 years prior to expected enrollment date or diagnosed greater than 3 years prior to expected enrollment date and never treated  Participant has had definitive treatment for invasive or hematologic malignancy within the 3 years prior to expected enrollment date  Participant is not able to comply with protocol procedures  Participant is not a current patient at a participating center  Participant is currently enrolled or was previously enrolled in another [facility]-sponsored study  Participant is current or previous employee/contractor of [email or web address]  Participant is currently pregnant (by participant's self-report of pregnancy status)    DOCUMENTATION OF INFORMED CONSENT    Prior to the Informed Consent (IC) being signed, or any study protocol required data collection, testing, procedure, or intervention being performed, the  following was done and/or discussed:  Patient was given a copy of the IC for review   Purpose of the study and qualifications to participate   Study design, Follow up schedule, and tests or procedures done at each visit  Confidentiality and HIPAA Authorization for Release of Medical Records for the research trial/ subject's rights/research related injury  Risk, Benefits, Alternative Treatments, Compensation and Costs  Participation in the research trial is voluntary and patient may withdraw at anytime  Contact information for study related questions    Patient verbalizes understanding of the above: Yes  Contact information for CRC and PI given to patient: Yes  Patient able to adequately summarize: the purpose of the study, the risks associated with the study, and all procedures, testing, and follow-ups associated with the study: Yes    Patient signed the informed consent form for the research study with an IRB approval date of 02 JUL 2024. Each page of the consent form was reviewed with patient and all questions answered satisfactorily. Patient received a copy of the consent form. The original consent was scanned into electronic medical records.    INSURANCE VERIFICATION    Thoroughly discussed with patient that the study team does not expect them to be billed for this test. However, by participating in this trial, we cannot guarantee that they will not receive a bill, as cost ultimately depends on the details of their Medicare coverage. Patient voiced understanding.      BLOOD DRAW    Following IC being signed and prior to blood draw, patient completed all baseline/pretest questionnaires. The following specimens were collected from the pt at the time of this encounter via peripheral blood draw.    Blood draw location: Left Arm  Needle used: 21 gauge butterfly needle  Blood draw amount: 20ml  Blood draw time: 1:28 PM

## 2025-07-27 ENCOUNTER — TELEPHONE (OUTPATIENT)
Dept: PRIMARY CARE CLINIC | Facility: CLINIC | Age: 65
End: 2025-07-27
Payer: MEDICARE

## 2025-07-28 NOTE — TELEPHONE ENCOUNTER
See how pt is doing since starting the crestor at 20 mg/d.  Any leg cramps?  Also, I believe she is due for her fu with hepatology to repeat a Fibroscan. Please have her sched an fu with them.   Dr. BOTELLO

## 2025-07-31 ENCOUNTER — TELEPHONE (OUTPATIENT)
Dept: RESEARCH | Facility: HOSPITAL | Age: 65
End: 2025-07-31
Payer: MEDICARE

## 2025-08-07 ENCOUNTER — PATIENT MESSAGE (OUTPATIENT)
Dept: PRIMARY CARE CLINIC | Facility: CLINIC | Age: 65
End: 2025-08-07
Payer: MEDICARE

## 2025-08-15 ENCOUNTER — TELEPHONE (OUTPATIENT)
Dept: RESEARCH | Facility: HOSPITAL | Age: 65
End: 2025-08-15
Payer: MEDICARE

## 2025-08-20 ENCOUNTER — TELEPHONE (OUTPATIENT)
Dept: RESEARCH | Facility: HOSPITAL | Age: 65
End: 2025-08-20
Payer: MEDICARE